# Patient Record
Sex: MALE | Race: WHITE | Employment: FULL TIME | URBAN - METROPOLITAN AREA
[De-identification: names, ages, dates, MRNs, and addresses within clinical notes are randomized per-mention and may not be internally consistent; named-entity substitution may affect disease eponyms.]

---

## 2017-03-01 ENCOUNTER — ED HISTORICAL/CONVERTED ENCOUNTER (OUTPATIENT)
Dept: OTHER | Age: 57
End: 2017-03-01

## 2017-03-02 ENCOUNTER — IP HISTORICAL/CONVERTED ENCOUNTER (OUTPATIENT)
Dept: OTHER | Age: 57
End: 2017-03-02

## 2017-03-07 ENCOUNTER — OP HISTORICAL/CONVERTED ENCOUNTER (OUTPATIENT)
Dept: OTHER | Age: 57
End: 2017-03-07

## 2017-07-11 ENCOUNTER — IP HISTORICAL/CONVERTED ENCOUNTER (OUTPATIENT)
Dept: OTHER | Age: 57
End: 2017-07-11

## 2017-07-24 ENCOUNTER — OP HISTORICAL/CONVERTED ENCOUNTER (OUTPATIENT)
Dept: OTHER | Age: 57
End: 2017-07-24

## 2017-08-23 ENCOUNTER — OP HISTORICAL/CONVERTED ENCOUNTER (OUTPATIENT)
Dept: OTHER | Age: 57
End: 2017-08-23

## 2017-09-04 ENCOUNTER — OP HISTORICAL/CONVERTED ENCOUNTER (OUTPATIENT)
Dept: OTHER | Age: 57
End: 2017-09-04

## 2020-08-24 VITALS
WEIGHT: 164 LBS | DIASTOLIC BLOOD PRESSURE: 70 MMHG | HEART RATE: 107 BPM | SYSTOLIC BLOOD PRESSURE: 130 MMHG | BODY MASS INDEX: 21.74 KG/M2 | HEIGHT: 73 IN | OXYGEN SATURATION: 98 %

## 2020-08-25 ENCOUNTER — OFFICE VISIT (OUTPATIENT)
Dept: ENT CLINIC | Age: 60
End: 2020-08-25
Payer: COMMERCIAL

## 2020-08-25 VITALS
OXYGEN SATURATION: 98 % | BODY MASS INDEX: 21.74 KG/M2 | RESPIRATION RATE: 18 BRPM | TEMPERATURE: 98.4 F | SYSTOLIC BLOOD PRESSURE: 140 MMHG | DIASTOLIC BLOOD PRESSURE: 80 MMHG | HEIGHT: 73 IN | WEIGHT: 164 LBS | HEART RATE: 82 BPM

## 2020-08-25 DIAGNOSIS — H61.23 BILATERAL IMPACTED CERUMEN: ICD-10-CM

## 2020-08-25 DIAGNOSIS — H61.303 ACQUIRED STENOSIS OF BOTH EXTERNAL EAR CANALS: ICD-10-CM

## 2020-08-25 DIAGNOSIS — H91.93 BILATERAL HEARING LOSS, UNSPECIFIED HEARING LOSS TYPE: Primary | ICD-10-CM

## 2020-08-25 PROCEDURE — 69210 REMOVE IMPACTED EAR WAX UNI: CPT | Performed by: OTOLARYNGOLOGY

## 2020-08-25 NOTE — PROGRESS NOTES
Subjective:    Es Ramp.   64 y.o.   1960     HPI     6 mos f/u ears  Not as bad as last time  But ears are feeling clogged  Hearing is affected      Review of Systems  Review of Systems   Constitutional: Negative for chills and fever. HENT: Positive for hearing loss. Respiratory: Negative for cough. Cardiovascular: Negative for chest pain. Musculoskeletal: Positive for joint pain. Neurological: Negative for dizziness and headaches. Past Medical History:   Diagnosis Date    Aneurysm ([address] 5/2015    CURRENT    Aneurysm (HCC)     Arthritis     Chest pain, unspecified     NOT CLEAR ON ETIOLOGY, POSSIBLE CHEST WALL, LESS LIKELY ANGINA, CHECK ECHO TO R/O PERIDARDITIS    Essential hypertension, benign     STABLE    GERD (gastroesophageal reflux disease)     HTN (hypertension)     Urinary frequency      Prior to Admission medications    Medication Sig Start Date End Date Taking? Authorizing Provider   levETIRAcetam (KEPPRA) 500 mg tablet Take 1 Tab by mouth every twelve (12) hours every twelve (12) hours. 7/13/15   Jimenez Ayala MD   HYDROmorphone (DILAUDID) 2 mg tablet Take 1-2 Tabs by mouth every four (4) hours as needed for Pain. Max Daily Amount: 24 mg. 7/13/15   Jimenez Ayala MD   losartan (COZAAR) 100 mg tablet Take 100 mg by mouth daily. Provider, Historical   diclofenac-misoprostol (ARTHROTEC 50)  mg-mcg per tablet Take 1 Tab by mouth two (2) times daily as needed. Provider, Historical   omeprazole (PRILOSEC) 40 mg capsule Take 40 mg by mouth daily. Provider, Historical   buPROPion SR (WELLBUTRIN SR) 150 mg SR tablet Take 150 mg by mouth daily. Indications: SMOKING CESSATION    Provider, Historical   traMADol (ULTRAM) 50 mg tablet Take 50 mg by mouth every six (6) hours as needed for Pain.     Provider, Historical            Objective:     Visit Vitals  /80 (BP 1 Location: Left arm, BP Patient Position: Sitting)   Pulse 82   Temp 98.4 °F (36.9 °C) (Oral)   Resp 18   Ht 6' 1\" (1.854 m)   Wt 164 lb (74.4 kg)   SpO2 98%   BMI 21.64 kg/m²        Physical Exam  Constitutional:       Appearance: Normal appearance. HENT:      Head: Normocephalic and atraumatic. Right Ear: Tympanic membrane and external ear normal. There is impacted cerumen. Left Ear: Tympanic membrane and external ear normal. There is impacted cerumen. Ears:      Comments: Canal stenosis    Procedure - Removal Impacted Cerumen    Ears are examined under microscope. bilateral ears are cleaned using otologic curette, suction, and/or alligator forceps. Pink solution used. Nose: Nose normal.   Eyes:      Pupils: Pupils are equal, round, and reactive to light. Neck:      Musculoskeletal: Neck supple. Pulmonary:      Effort: No respiratory distress. Skin:     General: Skin is warm. Neurological:      General: No focal deficit present. Mental Status: He is alert and oriented to person, place, and time. Psychiatric:         Mood and Affect: Mood normal.         Behavior: Behavior normal.         Assessment/Plan:     Encounter Diagnoses   Name Primary? 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 Follow-up and Dispositions    · Return in about 6 months (around 2/25/2021).

## 2021-02-23 ENCOUNTER — OFFICE VISIT (OUTPATIENT)
Dept: ENT CLINIC | Age: 61
End: 2021-02-23
Payer: COMMERCIAL

## 2021-02-23 VITALS
DIASTOLIC BLOOD PRESSURE: 90 MMHG | WEIGHT: 164 LBS | HEART RATE: 85 BPM | RESPIRATION RATE: 18 BRPM | HEIGHT: 73 IN | BODY MASS INDEX: 21.74 KG/M2 | SYSTOLIC BLOOD PRESSURE: 160 MMHG | OXYGEN SATURATION: 98 % | TEMPERATURE: 97.1 F

## 2021-02-23 DIAGNOSIS — H61.23 BILATERAL IMPACTED CERUMEN: Primary | ICD-10-CM

## 2021-02-23 PROCEDURE — 69210 REMOVE IMPACTED EAR WAX UNI: CPT | Performed by: OTOLARYNGOLOGY

## 2021-02-23 RX ORDER — ZINC GLUCONATE 50 MG
TABLET ORAL
COMMUNITY
Start: 2021-01-26 | End: 2022-07-07

## 2021-02-23 RX ORDER — GLUCOSAMINE SULFATE 1500 MG
POWDER IN PACKET (EA) ORAL
COMMUNITY
Start: 2021-01-26 | End: 2022-07-07

## 2021-02-23 RX ORDER — ALBUTEROL SULFATE 0.63 MG/3ML
SOLUTION RESPIRATORY (INHALATION)
COMMUNITY
Start: 2020-12-29

## 2021-02-23 RX ORDER — GUAIFENESIN 100 MG/5ML
LIQUID (ML) ORAL
COMMUNITY
Start: 2020-12-29

## 2021-02-23 NOTE — PROGRESS NOTES
Subjective:    Magdi Anne.   64 y.o.   1960     Followup visit     6 mos f/u ears  Not as bad as last time  But ears are feeling clogged  Hearing is affected    2/23/21 - 6 mos f/u ears has been doing well    Review of Systems  Review of Systems   Constitutional: Negative for chills and fever. HENT: Positive for hearing loss. Respiratory: Negative for cough. Cardiovascular: Negative for chest pain. Musculoskeletal: Positive for joint pain. Neurological: Negative for dizziness and headaches. Past Medical History:   Diagnosis Date    Aneurysm ([address] 5/2015    CURRENT    Aneurysm (HCC)     Arthritis     Chest pain, unspecified     NOT CLEAR ON ETIOLOGY, POSSIBLE CHEST WALL, LESS LIKELY ANGINA, CHECK ECHO TO R/O PERIDARDITIS    Essential hypertension, benign     STABLE    GERD (gastroesophageal reflux disease)     HTN (hypertension)     Urinary frequency      Prior to Admission medications    Medication Sig Start Date End Date Taking? Authorizing Provider   zinc gluconate 50 mg tablet TAKE 1 TABLET BY MOUTH EVERY DAY 1/26/21   Provider, Historical   cholecalciferol (VITAMIN D3) 25 mcg (1,000 unit) cap TAKE 1 CAPSULE BY MOUTH EVERY DAY 1/26/21   Provider, Historical   aspirin 81 mg chewable tablet TAKE 1 TABLET BY MOUTH EVERY DAY 12/29/20   Provider, Historical   albuterol (ACCUNEB) 0.63 mg/3 mL nebulizer solution INHALATION USE 1 VIAL EVERY 4 HOURS AS NEEDED FOR WHEEZING 12/29/20   Provider, Historical   levETIRAcetam (KEPPRA) 500 mg tablet Take 1 Tab by mouth every twelve (12) hours every twelve (12) hours. 7/13/15   Rafita Staton MD   HYDROmorphone (DILAUDID) 2 mg tablet Take 1-2 Tabs by mouth every four (4) hours as needed for Pain. Max Daily Amount: 24 mg. 7/13/15   Rafita Staton MD   losartan (COZAAR) 100 mg tablet Take 100 mg by mouth daily.     Provider, Historical   diclofenac-misoprostol (ARTHROTEC 50)  mg-mcg per tablet Take 1 Tab by mouth two (2) times daily as needed. Provider, Historical   omeprazole (PRILOSEC) 40 mg capsule Take 40 mg by mouth daily. Provider, Historical   buPROPion SR (WELLBUTRIN SR) 150 mg SR tablet Take 150 mg by mouth daily. Indications: SMOKING CESSATION    Provider, Historical   traMADol (ULTRAM) 50 mg tablet Take 50 mg by mouth every six (6) hours as needed for Pain. Provider, Historical            Objective:     Visit Vitals  BP (!) 160/90 (BP 1 Location: Left upper arm, BP Patient Position: Sitting, BP Cuff Size: Adult)   Pulse 85   Temp 97.1 °F (36.2 °C) (Oral)   Resp 18   Ht 6' 1\" (1.854 m)   Wt 164 lb (74.4 kg)   SpO2 98%   BMI 21.64 kg/m²      Narrow EACs  Ears cleaned AU, cerumen R>L       Procedure - Removal Impacted Cerumen    Indications: cerumen impaction    Ears are examined under microscope/headlight.  bilateral ears are cleaned using otologic curette, suction, and/or alligator forceps. Assessment/Plan:     Encounter Diagnoses   Name Primary?  Bilateral impacted cerumen Yes     Ears cleaned AU  Narrow canals and very hard wax -recommend he use drops prior to next visit  Fu 6 mos    Orders Placed This Encounter    zinc gluconate 50 mg tablet    cholecalciferol (VITAMIN D3) 25 mcg (1,000 unit) cap    aspirin 81 mg chewable tablet    albuterol (ACCUNEB) 0.63 mg/3 mL nebulizer solution     Follow-up and Dispositions    · Return in about 6 months (around 8/23/2021).

## 2021-08-26 ENCOUNTER — TRANSCRIBE ORDER (OUTPATIENT)
Dept: SCHEDULING | Age: 61
End: 2021-08-26

## 2021-08-26 DIAGNOSIS — J98.4 DISEASE OF LUNG: Primary | ICD-10-CM

## 2021-08-27 ENCOUNTER — TRANSCRIBE ORDER (OUTPATIENT)
Dept: SCHEDULING | Age: 61
End: 2021-08-27

## 2021-08-27 DIAGNOSIS — J98.4 DISEASE OF LUNG: Primary | ICD-10-CM

## 2021-09-01 ENCOUNTER — HOSPITAL ENCOUNTER (OUTPATIENT)
Dept: CT IMAGING | Age: 61
Discharge: HOME | End: 2021-09-01
Attending: INTERNAL MEDICINE
Payer: COMMERCIAL

## 2021-09-01 DIAGNOSIS — J98.4 DISEASE OF LUNG: ICD-10-CM

## 2021-09-01 PROCEDURE — 71250 CT THORAX DX C-: CPT

## 2022-03-18 PROBLEM — H61.23 BILATERAL IMPACTED CERUMEN: Status: ACTIVE | Noted: 2020-08-25

## 2022-03-19 PROBLEM — H61.303 ACQUIRED STENOSIS OF BOTH EXTERNAL EAR CANALS: Status: ACTIVE | Noted: 2020-08-25

## 2022-06-29 ENCOUNTER — TRANSCRIBE ORDER (OUTPATIENT)
Dept: SCHEDULING | Age: 62
End: 2022-06-29

## 2022-06-29 DIAGNOSIS — R10.31 RLQ ABDOMINAL PAIN: Primary | ICD-10-CM

## 2022-07-06 PROBLEM — I25.10 ASHD (ARTERIOSCLEROTIC HEART DISEASE): Status: ACTIVE | Noted: 2022-07-06

## 2022-07-06 PROBLEM — R10.11 RUQ ABDOMINAL PAIN: Status: ACTIVE | Noted: 2022-07-06

## 2022-07-06 PROBLEM — K21.9 GERD (GASTROESOPHAGEAL REFLUX DISEASE): Status: ACTIVE | Noted: 2022-07-06

## 2022-07-07 ENCOUNTER — TRANSCRIBE ORDER (OUTPATIENT)
Dept: REGISTRATION | Age: 62
End: 2022-07-07

## 2022-07-07 ENCOUNTER — OFFICE VISIT (OUTPATIENT)
Dept: GASTROENTEROLOGY | Age: 62
End: 2022-07-07
Payer: COMMERCIAL

## 2022-07-07 ENCOUNTER — HOSPITAL ENCOUNTER (OUTPATIENT)
Dept: LAB | Age: 62
Discharge: HOME | End: 2022-07-07
Attending: NURSE PRACTITIONER
Payer: COMMERCIAL

## 2022-07-07 ENCOUNTER — HOSPITAL ENCOUNTER (OUTPATIENT)
Dept: CT IMAGING | Age: 62
Discharge: HOME | End: 2022-07-07
Attending: NURSE PRACTITIONER
Payer: COMMERCIAL

## 2022-07-07 VITALS
RESPIRATION RATE: 14 BRPM | DIASTOLIC BLOOD PRESSURE: 70 MMHG | WEIGHT: 171.6 LBS | SYSTOLIC BLOOD PRESSURE: 120 MMHG | TEMPERATURE: 98.1 F | BODY MASS INDEX: 22.74 KG/M2 | HEART RATE: 69 BPM | HEIGHT: 73 IN | OXYGEN SATURATION: 98 %

## 2022-07-07 DIAGNOSIS — R10.31 ABDOMINAL PAIN, RIGHT LOWER QUADRANT: ICD-10-CM

## 2022-07-07 DIAGNOSIS — Z86.010 HISTORY OF COLON POLYPS: ICD-10-CM

## 2022-07-07 DIAGNOSIS — R10.11 RUQ ABDOMINAL PAIN: Primary | ICD-10-CM

## 2022-07-07 DIAGNOSIS — R10.31 RLQ ABDOMINAL PAIN: ICD-10-CM

## 2022-07-07 DIAGNOSIS — R10.31 ABDOMINAL PAIN, RIGHT LOWER QUADRANT: Primary | ICD-10-CM

## 2022-07-07 LAB — CREAT SERPL-MCNC: 1.13 MG/DL (ref 0.7–1.3)

## 2022-07-07 PROCEDURE — 99204 OFFICE O/P NEW MOD 45 MIN: CPT | Performed by: INTERNAL MEDICINE

## 2022-07-07 PROCEDURE — 74011000636 HC RX REV CODE- 636: Performed by: FAMILY MEDICINE

## 2022-07-07 PROCEDURE — 82565 ASSAY OF CREATININE: CPT

## 2022-07-07 PROCEDURE — 74177 CT ABD & PELVIS W/CONTRAST: CPT

## 2022-07-07 PROCEDURE — 36415 COLL VENOUS BLD VENIPUNCTURE: CPT

## 2022-07-07 RX ORDER — POLYETHYLENE GLYCOL 3350 17 G/17G
POWDER, FOR SOLUTION ORAL
Qty: 510 G | Refills: 0 | Status: SHIPPED | OUTPATIENT
Start: 2022-07-07 | End: 2022-07-13

## 2022-07-07 RX ORDER — ATORVASTATIN CALCIUM 10 MG/1
10 TABLET, FILM COATED ORAL DAILY
COMMUNITY
Start: 2022-05-28

## 2022-07-07 RX ORDER — METOPROLOL SUCCINATE 100 MG/1
100 TABLET, EXTENDED RELEASE ORAL DAILY
COMMUNITY
Start: 2022-06-17

## 2022-07-07 RX ORDER — AMLODIPINE BESYLATE 10 MG/1
10 TABLET ORAL DAILY
COMMUNITY
Start: 2022-06-17

## 2022-07-07 RX ADMIN — DIATRIZOATE MEGLUMINE AND DIATRIZOATE SODIUM 30 ML: 660; 100 LIQUID ORAL; RECTAL at 10:16

## 2022-07-07 RX ADMIN — IOPAMIDOL 100 ML: 755 INJECTION, SOLUTION INTRAVENOUS at 10:16

## 2022-07-07 NOTE — PROGRESS NOTES
1. Have you been to the ER, urgent care clinic since your last visit? Hospitalized since your last visit? no    2. Have you seen or consulted any other health care providers outside of the [address] [place] [address] since your last visit? Include any pap smears or colon screening.   No   Chief Complaint   Patient presents with    Abdominal Pain     x 1 week    New Patient     Visit Vitals  /70 (BP 1 Location: Left upper arm, BP Patient Position: Sitting, BP Cuff Size: Adult)   Pulse 69   Temp 98.1 °F (36.7 °C) (Temporal)   Resp 14   Ht 6' 1\" (1.854 m)   Wt 77.8 kg (171 lb 9.6 oz)   SpO2 98% Comment: room air   BMI 22.64 kg/m²
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
## 2022-07-08 NOTE — PROGRESS NOTES
COLONOSCOPY IS SCHEDULED FOR 7-, at 12:00 pm  NO PA REQUIRED PER LAURENT HK-ON 7-8-2022 AT 9:35 AM - REF # [id number].

## 2022-07-13 ENCOUNTER — ANESTHESIA (OUTPATIENT)
Dept: ENDOSCOPY | Age: 62
End: 2022-07-13
Payer: COMMERCIAL

## 2022-07-13 ENCOUNTER — HOSPITAL ENCOUNTER (OUTPATIENT)
Age: 62
Setting detail: OUTPATIENT SURGERY
Discharge: HOME | End: 2022-07-13
Attending: INTERNAL MEDICINE | Admitting: INTERNAL MEDICINE
Payer: COMMERCIAL

## 2022-07-13 ENCOUNTER — ANESTHESIA EVENT (OUTPATIENT)
Dept: ENDOSCOPY | Age: 62
End: 2022-07-13
Payer: COMMERCIAL

## 2022-07-13 VITALS
SYSTOLIC BLOOD PRESSURE: 141 MMHG | RESPIRATION RATE: 18 BRPM | BODY MASS INDEX: 23.55 KG/M2 | TEMPERATURE: 98.1 F | DIASTOLIC BLOOD PRESSURE: 82 MMHG | HEIGHT: 70 IN | HEART RATE: 79 BPM | OXYGEN SATURATION: 98 % | WEIGHT: 164.5 LBS

## 2022-07-13 PROCEDURE — 2709999900 HC NON-CHARGEABLE SUPPLY: Performed by: INTERNAL MEDICINE

## 2022-07-13 PROCEDURE — 45378 DIAGNOSTIC COLONOSCOPY: CPT | Performed by: INTERNAL MEDICINE

## 2022-07-13 PROCEDURE — 76040000007: Performed by: INTERNAL MEDICINE

## 2022-07-13 PROCEDURE — 74011250636 HC RX REV CODE- 250/636

## 2022-07-13 PROCEDURE — 74011250636 HC RX REV CODE- 250/636: Performed by: NURSE ANESTHETIST, CERTIFIED REGISTERED

## 2022-07-13 PROCEDURE — 74011000250 HC RX REV CODE- 250: Performed by: NURSE ANESTHETIST, CERTIFIED REGISTERED

## 2022-07-13 PROCEDURE — 74011250636 HC RX REV CODE- 250/636: Performed by: INTERNAL MEDICINE

## 2022-07-13 PROCEDURE — 76060000032 HC ANESTHESIA 0.5 TO 1 HR: Performed by: INTERNAL MEDICINE

## 2022-07-13 RX ORDER — SODIUM CHLORIDE 0.9 % (FLUSH) 0.9 %
5-40 SYRINGE (ML) INJECTION EVERY 8 HOURS
Status: DISCONTINUED | OUTPATIENT
Start: 2022-07-13 | End: 2022-07-13 | Stop reason: HOSPADM

## 2022-07-13 RX ORDER — SODIUM CHLORIDE 9 MG/ML
150 INJECTION, SOLUTION INTRAVENOUS CONTINUOUS
Status: DISCONTINUED | OUTPATIENT
Start: 2022-07-13 | End: 2022-07-13 | Stop reason: HOSPADM

## 2022-07-13 RX ORDER — PROPOFOL 10 MG/ML
INJECTION, EMULSION INTRAVENOUS AS NEEDED
Status: DISCONTINUED | OUTPATIENT
Start: 2022-07-13 | End: 2022-07-13 | Stop reason: HOSPADM

## 2022-07-13 RX ORDER — LIDOCAINE HYDROCHLORIDE 20 MG/ML
INJECTION, SOLUTION EPIDURAL; INFILTRATION; INTRACAUDAL; PERINEURAL AS NEEDED
Status: DISCONTINUED | OUTPATIENT
Start: 2022-07-13 | End: 2022-07-13 | Stop reason: HOSPADM

## 2022-07-13 RX ORDER — SODIUM CHLORIDE 0.9 % (FLUSH) 0.9 %
5-40 SYRINGE (ML) INJECTION AS NEEDED
Status: DISCONTINUED | OUTPATIENT
Start: 2022-07-13 | End: 2022-07-13 | Stop reason: HOSPADM

## 2022-07-13 RX ORDER — MIDAZOLAM HYDROCHLORIDE 1 MG/ML
INJECTION, SOLUTION INTRAMUSCULAR; INTRAVENOUS AS NEEDED
Status: DISCONTINUED | OUTPATIENT
Start: 2022-07-13 | End: 2022-07-13 | Stop reason: HOSPADM

## 2022-07-13 RX ORDER — SODIUM CHLORIDE 9 MG/ML
125 INJECTION, SOLUTION INTRAVENOUS CONTINUOUS
Status: DISCONTINUED | OUTPATIENT
Start: 2022-07-13 | End: 2022-07-13 | Stop reason: HOSPADM

## 2022-07-13 RX ADMIN — MIDAZOLAM 2 MG: 1 INJECTION INTRAMUSCULAR; INTRAVENOUS at 13:48

## 2022-07-13 RX ADMIN — PROPOFOL 60 MG: 10 INJECTION, EMULSION INTRAVENOUS at 13:49

## 2022-07-13 RX ADMIN — SODIUM CHLORIDE 150 ML/HR: 9 INJECTION, SOLUTION INTRAVENOUS at 11:43

## 2022-07-13 RX ADMIN — PROPOFOL 50 MG: 10 INJECTION, EMULSION INTRAVENOUS at 14:01

## 2022-07-13 RX ADMIN — PROPOFOL 50 MG: 10 INJECTION, EMULSION INTRAVENOUS at 13:58

## 2022-07-13 RX ADMIN — PROPOFOL 40 MG: 10 INJECTION, EMULSION INTRAVENOUS at 13:54

## 2022-07-13 RX ADMIN — LIDOCAINE HYDROCHLORIDE 40 MG: 20 INJECTION, SOLUTION EPIDURAL; INFILTRATION; INTRACAUDAL at 13:49

## 2022-07-13 RX ADMIN — PROPOFOL 30 MG: 10 INJECTION, EMULSION INTRAVENOUS at 14:04

## 2022-07-13 NOTE — OP NOTES
Colonoscopy Procedure Note      Patient: Wen Mccollum MRN: [id number]  SSN: [id number]    YOB: 1960  Age: 63 y.o. Sex: male      Date of Procedure: 7/13/2022  Date/Time:  7/13/2022 2:11 PM       IMPRESSION:     1. Normal colon to cecum         RECOMMENDATIONS:     1) Repeat colonoscopy in 5 years. INDICATION: History of colon polyps, abdominal pain     PROCEDURE PERFORMED: Colonoscopy     DESCRIPTION OF PROCEDURE: An informed consent was obtained. The patient was placed in left lateral position. Perianal inspection and a digital rectal exam was performed. Video colonoscope was introduced into the rectum and advanced under direct vision up to the terminal ileum. With adequate insufflation and maneuvering of the withdrawing scope, the colonic mucosa was visualized carefully. Retroflexion was performed in the rectum to see the anorectum and also in the ascending colon to look behind the folds. Vital signs, pulse oximetry, single lead cardiac monitor were monitored throughout the procedure as the sedation was titrated to the desired effect ensuring patient comfort and safety. The patient tolerated the procedure very well and was transferred to the recovery area. Following is the summary of findings: No mucosal lesions were noted to the level of the cecum. ENDOSCOPIST: Leigh Ann Lane MD      ENDOSCOPE: Olympus videocolonoscope     ASSISTANT:Circ-1: Umer Farah              Scrub Tech-1: Charli Queen     ANESTHESIA: TIVA      QUALITY OF PREPARATION: Good      FINDINGS:   1.  Normal colon to cecum       Complications: None     EBL: None     SPECIMENS: * No specimens in log Kiera Pena MD  July 13, 2022  2:11 PM

## 2022-07-13 NOTE — ANESTHESIA POSTPROCEDURE EVALUATION
Procedure(s):  COLONOSCOPY (TIVA). MAC, total IV anesthesia    Anesthesia Post Evaluation      Multimodal analgesia: multimodal analgesia used between 6 hours prior to anesthesia start to PACU discharge  Patient location during evaluation: PACU  Patient participation: complete - patient participated  Level of consciousness: awake  Pain score: 0  Pain management: adequate  Airway patency: patent  Anesthetic complications: no  Cardiovascular status: acceptable  Respiratory status: room air  Hydration status: acceptable  Post anesthesia nausea and vomiting:  none  Final Post Anesthesia Temperature Assessment:  Normothermia (36.0-37.5 degrees C)      INITIAL Post-op Vital signs: No vitals data found for the desired time range.

## 2022-07-13 NOTE — DISCHARGE INSTRUCTIONS

## 2022-07-13 NOTE — ANESTHESIA PREPROCEDURE EVALUATION
Relevant Problems   CARDIOVASCULAR   (+) ASHD (arteriosclerotic heart disease)   (+) Essential hypertension, benign   (+) HTN (hypertension)      GASTROINTESTINAL   (+) GERD (gastroesophageal reflux disease)       Anesthetic History               Review of Systems / Medical History      Pulmonary          Smoker         Neuro/Psych              Cardiovascular                       GI/Hepatic/Renal                Endo/Other             Other Findings              Physical Exam    Airway  Mallampati: III  TM Distance: 4 - 6 cm  Neck ROM: normal range of motion   Mouth opening: Normal     Cardiovascular               Dental  No notable dental hx       Pulmonary                 Abdominal         Other Findings            Anesthetic Plan    ASA: 3  Anesthesia type: MAC and total IV anesthesia            Anesthetic plan and risks discussed with: Patient

## 2022-08-08 ENCOUNTER — OFFICE VISIT (OUTPATIENT)
Dept: SURGERY | Age: 62
End: 2022-08-08
Payer: COMMERCIAL

## 2022-08-08 VITALS
RESPIRATION RATE: 18 BRPM | HEART RATE: 77 BPM | BODY MASS INDEX: 23.38 KG/M2 | OXYGEN SATURATION: 98 % | WEIGHT: 176.4 LBS | SYSTOLIC BLOOD PRESSURE: 150 MMHG | HEIGHT: 73 IN | TEMPERATURE: 97.8 F | DIASTOLIC BLOOD PRESSURE: 86 MMHG

## 2022-08-08 DIAGNOSIS — R10.31 RLQ ABDOMINAL PAIN: ICD-10-CM

## 2022-08-08 PROCEDURE — 99203 OFFICE O/P NEW LOW 30 MIN: CPT | Performed by: COLON & RECTAL SURGERY

## 2022-08-08 PROCEDURE — 3074F SYST BP LT 130 MM HG: CPT | Performed by: COLON & RECTAL SURGERY

## 2022-08-08 PROCEDURE — 3078F DIAST BP <80 MM HG: CPT | Performed by: COLON & RECTAL SURGERY

## 2022-08-08 RX ORDER — LANOLIN ALCOHOL/MO/W.PET/CERES
400 CREAM (GRAM) TOPICAL DAILY
COMMUNITY
Start: 2022-06-27

## 2022-10-03 ENCOUNTER — HOSPITAL ENCOUNTER (EMERGENCY)
Age: 62
Discharge: HOME | End: 2022-10-03
Attending: STUDENT IN AN ORGANIZED HEALTH CARE EDUCATION/TRAINING PROGRAM
Payer: COMMERCIAL

## 2022-10-03 ENCOUNTER — APPOINTMENT (OUTPATIENT)
Dept: CT IMAGING | Age: 62
End: 2022-10-03
Attending: STUDENT IN AN ORGANIZED HEALTH CARE EDUCATION/TRAINING PROGRAM
Payer: COMMERCIAL

## 2022-10-03 VITALS
BODY MASS INDEX: 23.33 KG/M2 | HEIGHT: 73 IN | TEMPERATURE: 97.9 F | WEIGHT: 176 LBS | HEART RATE: 77 BPM | OXYGEN SATURATION: 97 % | SYSTOLIC BLOOD PRESSURE: 148 MMHG | RESPIRATION RATE: 20 BRPM | DIASTOLIC BLOOD PRESSURE: 82 MMHG

## 2022-10-03 DIAGNOSIS — K29.00 ACUTE SUPERFICIAL GASTRITIS WITHOUT HEMORRHAGE: Primary | ICD-10-CM

## 2022-10-03 LAB
ALBUMIN SERPL-MCNC: 4.3 G/DL (ref 3.5–5)
ALBUMIN/GLOB SERPL: 1.3 {RATIO} (ref 1.1–2.2)
ALP SERPL-CCNC: 81 U/L (ref 45–117)
ALT SERPL-CCNC: 121 U/L (ref 12–78)
ANION GAP SERPL CALC-SCNC: 11 MMOL/L (ref 5–15)
APPEARANCE UR: ABNORMAL
AST SERPL W P-5'-P-CCNC: 139 U/L (ref 15–37)
BACTERIA URNS QL MICRO: NEGATIVE /HPF
BASOPHILS # BLD: 0 K/UL (ref 0–0.1)
BASOPHILS NFR BLD: 0 % (ref 0–1)
BILIRUB SERPL-MCNC: 0.5 MG/DL (ref 0.2–1)
BILIRUB UR QL: NEGATIVE
BUN SERPL-MCNC: 14 MG/DL (ref 6–20)
BUN/CREAT SERPL: 11 (ref 12–20)
CA-I BLD-MCNC: 9.4 MG/DL (ref 8.5–10.1)
CHLORIDE SERPL-SCNC: 99 MMOL/L (ref 97–108)
CO2 SERPL-SCNC: 24 MMOL/L (ref 21–32)
COLLECT DATE STL: NORMAL
COLOR UR: ABNORMAL
CREAT SERPL-MCNC: 1.33 MG/DL (ref 0.7–1.3)
DIFFERENTIAL METHOD BLD: NORMAL
EOSINOPHIL # BLD: 0 K/UL (ref 0–0.4)
EOSINOPHIL NFR BLD: 1 % (ref 0–7)
ERYTHROCYTE [DISTWIDTH] IN BLOOD BY AUTOMATED COUNT: 13.5 % (ref 11.5–14.5)
GLOBULIN SER CALC-MCNC: 3.2 G/DL (ref 2–4)
GLUCOSE SERPL-MCNC: 104 MG/DL (ref 65–100)
GLUCOSE UR STRIP.AUTO-MCNC: NEGATIVE MG/DL
HCT VFR BLD AUTO: 41.8 % (ref 36.6–50.3)
HEMOCCULT SP1 STL QL: NEGATIVE
HGB BLD-MCNC: 15 G/DL (ref 12.1–17)
HGB UR QL STRIP: NEGATIVE
HYALINE CASTS URNS QL MICRO: ABNORMAL /LPF (ref 0–5)
IMM GRANULOCYTES # BLD AUTO: 0 K/UL (ref 0–0.04)
IMM GRANULOCYTES NFR BLD AUTO: 0 % (ref 0–0.5)
KETONES UR QL STRIP.AUTO: 5 MG/DL
LEUKOCYTE ESTERASE UR QL STRIP.AUTO: ABNORMAL
LYMPHOCYTES # BLD: 1 K/UL (ref 0.8–3.5)
LYMPHOCYTES NFR BLD: 15 % (ref 12–49)
MCH RBC QN AUTO: 33.7 PG (ref 26–34)
MCHC RBC AUTO-ENTMCNC: 35.9 G/DL (ref 30–36.5)
MCV RBC AUTO: 93.9 FL (ref 80–99)
MONOCYTES # BLD: 0.7 K/UL (ref 0–1)
MONOCYTES NFR BLD: 10 % (ref 5–13)
MUCOUS THREADS URNS QL MICRO: ABNORMAL /LPF
NEUTS SEG # BLD: 4.9 K/UL (ref 1.8–8)
NEUTS SEG NFR BLD: 74 % (ref 32–75)
NITRITE UR QL STRIP.AUTO: NEGATIVE
NRBC # BLD: 0 K/UL (ref 0–0.01)
NRBC BLD-RTO: 0 PER 100 WBC
PH UR STRIP: 5 [PH] (ref 5–8)
PLATELET # BLD AUTO: 180 K/UL (ref 150–400)
PMV BLD AUTO: 10 FL (ref 8.9–12.9)
POTASSIUM SERPL-SCNC: 3.5 MMOL/L (ref 3.5–5.1)
PROT SERPL-MCNC: 7.5 G/DL (ref 6.4–8.2)
PROT UR STRIP-MCNC: 30 MG/DL
RBC # BLD AUTO: 4.45 M/UL (ref 4.1–5.7)
RBC #/AREA URNS HPF: ABNORMAL /HPF (ref 0–5)
SODIUM SERPL-SCNC: 134 MMOL/L (ref 136–145)
SP GR UR REFRACTOMETRY: 1.02 (ref 1–1.03)
UROBILINOGEN UR QL STRIP.AUTO: 0.1 EU/DL (ref 0.1–1)
WBC # BLD AUTO: 6.6 K/UL (ref 4.1–11.1)
WBC URNS QL MICRO: ABNORMAL /HPF (ref 0–4)

## 2022-10-03 PROCEDURE — 80053 COMPREHEN METABOLIC PANEL: CPT

## 2022-10-03 PROCEDURE — 82272 OCCULT BLD FECES 1-3 TESTS: CPT

## 2022-10-03 PROCEDURE — 85025 COMPLETE CBC W/AUTO DIFF WBC: CPT

## 2022-10-03 PROCEDURE — 36415 COLL VENOUS BLD VENIPUNCTURE: CPT

## 2022-10-03 PROCEDURE — 96375 TX/PRO/DX INJ NEW DRUG ADDON: CPT

## 2022-10-03 PROCEDURE — 96374 THER/PROPH/DIAG INJ IV PUSH: CPT

## 2022-10-03 PROCEDURE — 74011000636 HC RX REV CODE- 636: Performed by: STUDENT IN AN ORGANIZED HEALTH CARE EDUCATION/TRAINING PROGRAM

## 2022-10-03 PROCEDURE — 74011250636 HC RX REV CODE- 250/636: Performed by: STUDENT IN AN ORGANIZED HEALTH CARE EDUCATION/TRAINING PROGRAM

## 2022-10-03 PROCEDURE — 81001 URINALYSIS AUTO W/SCOPE: CPT

## 2022-10-03 PROCEDURE — 99285 EMERGENCY DEPT VISIT HI MDM: CPT

## 2022-10-03 PROCEDURE — 74011000250 HC RX REV CODE- 250: Performed by: STUDENT IN AN ORGANIZED HEALTH CARE EDUCATION/TRAINING PROGRAM

## 2022-10-03 PROCEDURE — 74177 CT ABD & PELVIS W/CONTRAST: CPT

## 2022-10-03 RX ORDER — ONDANSETRON 4 MG/1
4 TABLET, ORALLY DISINTEGRATING ORAL
Qty: 10 TABLET | Refills: 0 | Status: SHIPPED | OUTPATIENT
Start: 2022-10-03

## 2022-10-03 RX ORDER — SUCRALFATE 1 G/1
1 TABLET ORAL 4 TIMES DAILY
Qty: 30 TABLET | Refills: 0 | Status: SHIPPED | OUTPATIENT
Start: 2022-10-03

## 2022-10-03 RX ORDER — LORAZEPAM 2 MG/ML
1 INJECTION INTRAMUSCULAR
Status: COMPLETED | OUTPATIENT
Start: 2022-10-03 | End: 2022-10-03

## 2022-10-03 RX ORDER — FAMOTIDINE 20 MG/1
20 TABLET, FILM COATED ORAL 2 TIMES DAILY
Qty: 20 TABLET | Refills: 0 | Status: SHIPPED | OUTPATIENT
Start: 2022-10-03 | End: 2022-10-13

## 2022-10-03 RX ORDER — ONDANSETRON 2 MG/ML
4 INJECTION INTRAMUSCULAR; INTRAVENOUS ONCE
Status: COMPLETED | OUTPATIENT
Start: 2022-10-03 | End: 2022-10-03

## 2022-10-03 RX ADMIN — ONDANSETRON 4 MG: 2 INJECTION INTRAMUSCULAR; INTRAVENOUS at 11:31

## 2022-10-03 RX ADMIN — IOPAMIDOL 100 ML: 755 INJECTION, SOLUTION INTRAVENOUS at 12:41

## 2022-10-03 RX ADMIN — FAMOTIDINE 20 MG: 10 INJECTION, SOLUTION INTRAVENOUS at 11:31

## 2022-10-03 RX ADMIN — LORAZEPAM 1 MG: 2 INJECTION INTRAMUSCULAR; INTRAVENOUS at 11:31

## 2022-10-03 NOTE — ED TRIAGE NOTES
Patient complains of abdominal pain for the past few weeks, notice black stools. Has nausea and vomiting as well.
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
## 2023-04-25 PROBLEM — R10.31 RLQ ABDOMINAL PAIN: Status: ACTIVE | Noted: 2023-04-25
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
## 2023-05-17 ENCOUNTER — APPOINTMENT (OUTPATIENT)
Facility: HOSPITAL | Age: 63
End: 2023-05-17
Payer: COMMERCIAL

## 2023-05-17 ENCOUNTER — HOSPITAL ENCOUNTER (OUTPATIENT)
Facility: HOSPITAL | Age: 63
Setting detail: OBSERVATION
LOS: 1 days | Discharge: OTHER INSTITUTION | End: 2023-05-19
Attending: EMERGENCY MEDICINE | Admitting: FAMILY MEDICINE
Payer: COMMERCIAL

## 2023-05-17 DIAGNOSIS — R55 SYNCOPE AND COLLAPSE: Primary | ICD-10-CM

## 2023-05-17 PROBLEM — R42 SYNCOPAL VERTIGO: Status: ACTIVE | Noted: 2023-05-17

## 2023-05-17 LAB
ALBUMIN SERPL-MCNC: 3.3 G/DL (ref 3.5–5)
ALBUMIN/GLOB SERPL: 0.9 (ref 1.1–2.2)
ALP SERPL-CCNC: 57 U/L (ref 45–117)
ALT SERPL-CCNC: 37 U/L (ref 12–78)
ANION GAP SERPL CALC-SCNC: 7 MMOL/L (ref 5–15)
AST SERPL W P-5'-P-CCNC: 42 U/L (ref 15–37)
BASOPHILS # BLD: 0 K/UL (ref 0–0.1)
BASOPHILS NFR BLD: 0 % (ref 0–1)
BILIRUB SERPL-MCNC: 0.8 MG/DL (ref 0.2–1)
BUN SERPL-MCNC: 24 MG/DL (ref 6–20)
BUN/CREAT SERPL: 22 (ref 12–20)
CA-I BLD-MCNC: 8.6 MG/DL (ref 8.5–10.1)
CHLORIDE SERPL-SCNC: 101 MMOL/L (ref 97–108)
CO2 SERPL-SCNC: 24 MMOL/L (ref 21–32)
CREAT SERPL-MCNC: 1.07 MG/DL (ref 0.7–1.3)
DIFFERENTIAL METHOD BLD: ABNORMAL
EKG ATRIAL RATE: 70 BPM
EKG DIAGNOSIS: NORMAL
EKG P AXIS: 78 DEGREES
EKG P-R INTERVAL: 140 MS
EKG Q-T INTERVAL: 396 MS
EKG QRS DURATION: 96 MS
EKG QTC CALCULATION (BAZETT): 427 MS
EKG R AXIS: 62 DEGREES
EKG T AXIS: 72 DEGREES
EKG VENTRICULAR RATE: 70 BPM
EOSINOPHIL # BLD: 0.1 K/UL (ref 0–0.4)
EOSINOPHIL NFR BLD: 1 % (ref 0–7)
ERYTHROCYTE [DISTWIDTH] IN BLOOD BY AUTOMATED COUNT: 13.3 % (ref 11.5–14.5)
ETHANOL SERPL-MCNC: 34 MG/DL (ref 0–0.08)
GLOBULIN SER CALC-MCNC: 3.6 G/DL (ref 2–4)
GLUCOSE SERPL-MCNC: 80 MG/DL (ref 65–100)
HCT VFR BLD AUTO: 29.4 % (ref 36.6–50.3)
HGB BLD-MCNC: 10.9 G/DL (ref 12.1–17)
IMM GRANULOCYTES # BLD AUTO: 0 K/UL (ref 0–0.04)
IMM GRANULOCYTES NFR BLD AUTO: 0 % (ref 0–0.5)
LYMPHOCYTES # BLD: 0.6 K/UL (ref 0.8–3.5)
LYMPHOCYTES NFR BLD: 7 % (ref 12–49)
MCH RBC QN AUTO: 34.8 PG (ref 26–34)
MCHC RBC AUTO-ENTMCNC: 37.1 G/DL (ref 30–36.5)
MCV RBC AUTO: 93.9 FL (ref 80–99)
MONOCYTES # BLD: 1.1 K/UL (ref 0–1)
MONOCYTES NFR BLD: 13 % (ref 5–13)
NEUTS SEG # BLD: 6.6 K/UL (ref 1.8–8)
NEUTS SEG NFR BLD: 79 % (ref 32–75)
NRBC # BLD: 0 K/UL (ref 0–0.01)
NRBC BLD-RTO: 0 PER 100 WBC
PLATELET # BLD AUTO: 141 K/UL (ref 150–400)
PMV BLD AUTO: 10.6 FL (ref 8.9–12.9)
POTASSIUM SERPL-SCNC: 3.5 MMOL/L (ref 3.5–5.1)
PROT SERPL-MCNC: 6.9 G/DL (ref 6.4–8.2)
RBC # BLD AUTO: 3.13 M/UL (ref 4.1–5.7)
SODIUM SERPL-SCNC: 132 MMOL/L (ref 136–145)
TROPONIN I SERPL HS-MCNC: 6 NG/L (ref 0–76)
TROPONIN I SERPL HS-MCNC: 7 NG/L (ref 0–76)
WBC # BLD AUTO: 8.4 K/UL (ref 4.1–11.1)

## 2023-05-17 PROCEDURE — 6370000000 HC RX 637 (ALT 250 FOR IP): Performed by: EMERGENCY MEDICINE

## 2023-05-17 PROCEDURE — 36415 COLL VENOUS BLD VENIPUNCTURE: CPT

## 2023-05-17 PROCEDURE — G0378 HOSPITAL OBSERVATION PER HR: HCPCS

## 2023-05-17 PROCEDURE — 73110 X-RAY EXAM OF WRIST: CPT

## 2023-05-17 PROCEDURE — 85025 COMPLETE CBC W/AUTO DIFF WBC: CPT

## 2023-05-17 PROCEDURE — 93005 ELECTROCARDIOGRAM TRACING: CPT | Performed by: EMERGENCY MEDICINE

## 2023-05-17 PROCEDURE — 96372 THER/PROPH/DIAG INJ SC/IM: CPT

## 2023-05-17 PROCEDURE — 6360000002 HC RX W HCPCS: Performed by: FAMILY MEDICINE

## 2023-05-17 PROCEDURE — 80053 COMPREHEN METABOLIC PANEL: CPT

## 2023-05-17 PROCEDURE — 6370000000 HC RX 637 (ALT 250 FOR IP): Performed by: FAMILY MEDICINE

## 2023-05-17 PROCEDURE — 99285 EMERGENCY DEPT VISIT HI MDM: CPT

## 2023-05-17 PROCEDURE — 82077 ASSAY SPEC XCP UR&BREATH IA: CPT

## 2023-05-17 PROCEDURE — 84484 ASSAY OF TROPONIN QUANT: CPT

## 2023-05-17 PROCEDURE — 71045 X-RAY EXAM CHEST 1 VIEW: CPT

## 2023-05-17 PROCEDURE — 1100000000 HC RM PRIVATE

## 2023-05-17 PROCEDURE — 70450 CT HEAD/BRAIN W/O DYE: CPT

## 2023-05-17 RX ORDER — ACETAMINOPHEN 325 MG/1
650 TABLET ORAL EVERY 6 HOURS PRN
Status: DISCONTINUED | OUTPATIENT
Start: 2023-05-17 | End: 2023-05-19 | Stop reason: HOSPADM

## 2023-05-17 RX ORDER — ASPIRIN 81 MG/1
81 TABLET, CHEWABLE ORAL DAILY
Status: DISCONTINUED | OUTPATIENT
Start: 2023-05-17 | End: 2023-05-19 | Stop reason: HOSPADM

## 2023-05-17 RX ORDER — AMLODIPINE BESYLATE 5 MG/1
10 TABLET ORAL DAILY
Status: DISCONTINUED | OUTPATIENT
Start: 2023-05-17 | End: 2023-05-19 | Stop reason: HOSPADM

## 2023-05-17 RX ORDER — LOSARTAN POTASSIUM 100 MG/1
100 TABLET ORAL DAILY
Qty: 30 TABLET | Refills: 11 | COMMUNITY
Start: 2023-04-20 | End: 2024-04-19

## 2023-05-17 RX ORDER — CARVEDILOL 12.5 MG/1
12.5 TABLET ORAL 2 TIMES DAILY
COMMUNITY
Start: 2023-04-20

## 2023-05-17 RX ORDER — SODIUM CHLORIDE 0.9 % (FLUSH) 0.9 %
5-40 SYRINGE (ML) INJECTION PRN
Status: DISCONTINUED | OUTPATIENT
Start: 2023-05-17 | End: 2023-05-19 | Stop reason: HOSPADM

## 2023-05-17 RX ORDER — SODIUM CHLORIDE 9 MG/ML
INJECTION, SOLUTION INTRAVENOUS PRN
Status: DISCONTINUED | OUTPATIENT
Start: 2023-05-17 | End: 2023-05-19 | Stop reason: HOSPADM

## 2023-05-17 RX ORDER — SODIUM CHLORIDE 0.9 % (FLUSH) 0.9 %
5-40 SYRINGE (ML) INJECTION EVERY 12 HOURS SCHEDULED
Status: DISCONTINUED | OUTPATIENT
Start: 2023-05-17 | End: 2023-05-17

## 2023-05-17 RX ORDER — OMEPRAZOLE 10 MG/1
10 CAPSULE, DELAYED RELEASE ORAL DAILY
COMMUNITY

## 2023-05-17 RX ORDER — ONDANSETRON 4 MG/1
4 TABLET, ORALLY DISINTEGRATING ORAL EVERY 8 HOURS PRN
Status: DISCONTINUED | OUTPATIENT
Start: 2023-05-17 | End: 2023-05-19 | Stop reason: HOSPADM

## 2023-05-17 RX ORDER — ACETAMINOPHEN 650 MG/1
650 SUPPOSITORY RECTAL EVERY 6 HOURS PRN
Status: DISCONTINUED | OUTPATIENT
Start: 2023-05-17 | End: 2023-05-19 | Stop reason: HOSPADM

## 2023-05-17 RX ORDER — ONDANSETRON 2 MG/ML
4 INJECTION INTRAMUSCULAR; INTRAVENOUS EVERY 6 HOURS PRN
Status: DISCONTINUED | OUTPATIENT
Start: 2023-05-17 | End: 2023-05-19 | Stop reason: HOSPADM

## 2023-05-17 RX ORDER — AMLODIPINE BESYLATE 10 MG/1
10 TABLET ORAL DAILY
Qty: 30 TABLET | Refills: 22 | COMMUNITY
Start: 2022-06-17 | End: 2024-04-19

## 2023-05-17 RX ORDER — ATORVASTATIN CALCIUM 40 MG/1
40 TABLET, FILM COATED ORAL NIGHTLY
Status: DISCONTINUED | OUTPATIENT
Start: 2023-05-17 | End: 2023-05-19 | Stop reason: HOSPADM

## 2023-05-17 RX ORDER — CARVEDILOL 12.5 MG/1
12.5 TABLET ORAL 2 TIMES DAILY
Status: DISCONTINUED | OUTPATIENT
Start: 2023-05-17 | End: 2023-05-19 | Stop reason: HOSPADM

## 2023-05-17 RX ORDER — POLYETHYLENE GLYCOL 3350 17 G/17G
17 POWDER, FOR SOLUTION ORAL DAILY PRN
Status: DISCONTINUED | OUTPATIENT
Start: 2023-05-17 | End: 2023-05-19 | Stop reason: HOSPADM

## 2023-05-17 RX ORDER — SODIUM CHLORIDE 9 MG/ML
INJECTION, SOLUTION INTRAVENOUS CONTINUOUS
Status: DISCONTINUED | OUTPATIENT
Start: 2023-05-17 | End: 2023-05-19 | Stop reason: HOSPADM

## 2023-05-17 RX ORDER — CHOLECALCIFEROL (VITAMIN D3) 125 MCG
5 CAPSULE ORAL NIGHTLY PRN
Status: DISCONTINUED | OUTPATIENT
Start: 2023-05-17 | End: 2023-05-19 | Stop reason: HOSPADM

## 2023-05-17 RX ORDER — PANTOPRAZOLE SODIUM 40 MG/1
40 TABLET, DELAYED RELEASE ORAL
Status: DISCONTINUED | OUTPATIENT
Start: 2023-05-18 | End: 2023-05-19 | Stop reason: HOSPADM

## 2023-05-17 RX ORDER — NICOTINE 21 MG/24HR
1 PATCH, TRANSDERMAL 24 HOURS TRANSDERMAL DAILY
Status: DISCONTINUED | OUTPATIENT
Start: 2023-05-17 | End: 2023-05-19 | Stop reason: HOSPADM

## 2023-05-17 RX ORDER — ENOXAPARIN SODIUM 100 MG/ML
40 INJECTION SUBCUTANEOUS DAILY
Status: DISCONTINUED | OUTPATIENT
Start: 2023-05-17 | End: 2023-05-19 | Stop reason: HOSPADM

## 2023-05-17 RX ORDER — CYCLOBENZAPRINE HCL 10 MG
10 TABLET ORAL 3 TIMES DAILY PRN
Status: ON HOLD | COMMUNITY
Start: 2023-05-08 | End: 2023-05-19 | Stop reason: HOSPADM

## 2023-05-17 RX ORDER — LOSARTAN POTASSIUM 50 MG/1
100 TABLET ORAL DAILY
Status: DISCONTINUED | OUTPATIENT
Start: 2023-05-17 | End: 2023-05-19 | Stop reason: HOSPADM

## 2023-05-17 RX ADMIN — ACETAMINOPHEN 650 MG: 325 TABLET ORAL at 18:47

## 2023-05-17 RX ADMIN — ENOXAPARIN SODIUM 40 MG: 100 INJECTION SUBCUTANEOUS at 18:47

## 2023-05-17 ASSESSMENT — PAIN SCALES - GENERAL
PAINLEVEL_OUTOF10: 7
PAINLEVEL_OUTOF10: 3
PAINLEVEL_OUTOF10: 7
PAINLEVEL_OUTOF10: 6

## 2023-05-17 ASSESSMENT — LIFESTYLE VARIABLES
HOW OFTEN DO YOU HAVE A DRINK CONTAINING ALCOHOL: 4 OR MORE TIMES A WEEK
HOW MANY STANDARD DRINKS CONTAINING ALCOHOL DO YOU HAVE ON A TYPICAL DAY: 7 TO 9

## 2023-05-17 ASSESSMENT — PAIN DESCRIPTION - LOCATION
LOCATION: WRIST;SHOULDER
LOCATION: WRIST;SHOULDER
LOCATION: ARM
LOCATION: ARM

## 2023-05-17 ASSESSMENT — PAIN DESCRIPTION - ORIENTATION
ORIENTATION: RIGHT

## 2023-05-17 ASSESSMENT — PAIN - FUNCTIONAL ASSESSMENT: PAIN_FUNCTIONAL_ASSESSMENT: 0-10

## 2023-05-17 NOTE — CARE COORDINATION
05/17/23 1446   Service Assessment   Patient Orientation Alert and Oriented   Cognition Alert   History Provided By Other (see comment)  (Sister  Juan M Madrigal. ( [facility]))   Primary Caregiver Self   Support Systems Family Members   Patient's Healthcare Decision Maker is: Legal Next of Galo Floyd   PCP Verified by CM Yes  No [place])   Last Visit to PCP Within last 3 months  (Seen today 5/17/23.)   Prior Functional Level Independent in ADLs/IADLs   Current Functional Level Independent in ADLs/IADLs   Can patient return to prior living arrangement Yes   Ability to make needs known: Good   Family able to assist with home care needs: Yes  (If needed.)   Would you like for me to discuss the discharge plan with any other family members/significant others, and if so, who? Yes  (Sister Juan M Madrigal.)   [employer] None   Social/Functional History   Lives With Alone   Type of [address] [place] [address] One level   Home Access Stairs to enter without rails   Bathroom Shower/Tub Tub/Shower unit   Bathroom Toilet Standard   Bathroom Equipment None   Bathroom Accessibility Accessible   Receives Help From [address] Responsibilities Yes   Ambulation Assistance Independent   Transfer Assistance Independent   Active  Yes   Mode of Transportation Car   Occupation Full time employment  (Works as [employer] for [employer].)   Type of Occupation    Discharge Planning   Type of Residence [employer] [address] [facility] Prior To Admission None   Potential Assistance Needed N/A   DME Ordered? No   Potential Assistance Purchasing Medications No   Type of Home Care Services None   Patient expects to be discharged to: Trailer/mobile home   History of falls? 0   Services At/After Discharge   Transition of Care Consult (CM Consult) Discharge [address] [place] [address] [facility] Provided?  No   Mode of Transport at Discharge Other

## 2023-05-17 NOTE — ED NOTES
Multiple abrasions noted on body to L temple, and R clavicle larger abrasions     Mary Thomason RN  05/17/23 9159

## 2023-05-17 NOTE — ED PROVIDER NOTES
[facility] EMERGENCY DEPT  EMERGENCY DEPARTMENT HISTORY AND PHYSICAL EXAM      Date: 5/17/2023  Patient Name: Jorge Luis Dodson. MRN: [id number]  YOB: 1960  Date of evaluation: 5/17/2023  Provider: Adenike Ruby MD   Note Started: 1:05 PM EDT 5/17/23    HISTORY OF PRESENT ILLNESS     Chief Complaint   Patient presents with    Loss of Consciousness    Fatigue       History Provided By: Patient    HPI: Jorge Luis Means is a 63 y.o. male with history of hypertension presenting with syncope and frequent falls. Patient states over the past few days he has been passing out multiple times and falling. Denies any chest pain, shortness of breath. He has abrasions and bruising to his head, right shoulder, and right wrist due to falls. Patient states he was recently changed from metoprolol to carvedilol 4 weeks ago. Patient smokes 1.5 PPD. Denies drug use. States he drinks 6-8 beers on the weekends.     PAST MEDICAL HISTORY   Past Medical History:  Past Medical History:   Diagnosis Date    Aneurysm ([address]     Aneurysm ([address] 5/2015    CURRENT    Arthritis     ASHD (arteriosclerotic heart disease) 7/6/2022    Chest pain, unspecified     NOT CLEAR ON ETIOLOGY, POSSIBLE CHEST WALL, LESS LIKELY ANGINA, CHECK ECHO TO R/O PERIDARDITIS    Colon polyps     COVID-19 01/2022    Essential hypertension, benign     STABLE    GERD (gastroesophageal reflux disease)     HTN (hypertension)     RUQ abdominal pain 7/6/2022    Urinary frequency        Past Surgical History:  Past Surgical History:   Procedure Laterality Date    APPENDECTOMY,W OTHR C      BRAIN ANEURYSM SURGERY      COLONOSCOPY N/A 7/13/2022    COLONOSCOPY (TIVA) performed by Aidee Mccord MD at [address]      over 15 years ago-HX [address]  2005    LEFT ELBOW    OTHER SURGICAL HISTORY      STENTS    OTHER SURGICAL HISTORY      elbow    OTHER SURGICAL HISTORY  1990    maxilofacial surgery    OTHER

## 2023-05-17 NOTE — ED TRIAGE NOTES
Patient arrives by EMS from doctor's office c/o synope. Did not fully pass out today per EMS, No LOC. He did have two syncopal episodes and fell on Monday. On Monday night, he fell and hit his head on the kitchen table. Reports that he has fallen down from passing out 5 times in one day. Takes pain medications and blood pressure medications. Blood glucose 83 and normal sinus on 12 lead with EMS.   600mL Normal saline given by ems prior to arrival.

## 2023-05-17 NOTE — H&P
History and Physical    NAME:   Shyam Guadarrama. :  1960   MRN:  [id number]     Date/Time: 2023 6:26 PM    Patient PCP: Britt Cm MD  ______________________________________________________________________       Subjective:     CHIEF COMPLAINT:     Syncope    HISTORY OF PRESENT ILLNESS:       Patient is a 63y.o. year old male with a hx of brain aneurysm with clipping, DDD, arteriosclerotic heart disease, HTN, GERD, HLD, colon polyps, previous appendectomy arrives via EMS with a cc of syncope. The patient was at his PCP office getting his BP taken when he lost consciousness for a couple of seconds. The patient states that he has had 5 similar syncopal episodes this week. Another episode occurred after the patient had been sitting and eating dinner for a long period of time. He has hit his head and extremities during the falls. He endorses lightheadedness and palpitations just prior to the syncopal episodes. He denies chest pain, SOB, fever, chills, abdominal pain, headache, changes in vision, weakness, hematochezia, N/V, change in urine. Head CT without contrast: Inferior right frontal lobe encephalomalacia. No acute intracranial hemorrhage, mass or infarct. CXR shows no acute process  3v XR of right wrist shows no fracture    EKG: Normal sinus rhythm   Moderate voltage criteria for LVH, may be normal variant   Nonspecific ST and T wave abnormality   Abnormal ECG   When compared with ECG of 08-AUG-2020 19:28,   PREVIOUS ECG IS PRESENT     Blood EtOH level is 34. States he drinks 6-8 beers on the weekends. He again declines any recent drinking but his wife when interviewed privately does report that patient \"likes to drink\" and probably drinks every day. Received 600mL NS from EMS.      Past Medical History:   Diagnosis Date    Aneurysm ([address]     Aneurysm ([address] 2015    CURRENT    Arthritis     ASHD (arteriosclerotic heart disease) 2022    Chest pain, unspecified     NOT CLEAR ON
LVH, may be normal variant  Nonspecific ST and T wave abnormality  Abnormal ECG  When compared with ECG of 08-AUG-2020 19:28,  PREVIOUS ECG IS PRESENT  Confirmed by Lyle Farmer ([id number]) on 5/17/2023 3:27:07 PM     CBC with Auto Differential    Collection Time: 05/17/23 12:33 PM   Result Value Ref Range    WBC 8.4 4.1 - 11.1 K/uL    RBC 3.13 (L) 4.10 - 5.70 M/uL    Hemoglobin 10.9 (L) 12.1 - 17.0 g/dL    Hematocrit 29.4 (L) 36.6 - 50.3 %    MCV 93.9 80.0 - 99.0 FL    MCH 34.8 (H) 26.0 - 34.0 PG    MCHC 37.1 (H) 30.0 - 36.5 g/dL    RDW 13.3 11.5 - 14.5 %    Platelets 528 (L) 474 - 400 K/uL    MPV 10.6 8.9 - 12.9 FL    Nucleated RBCs 0.0 0.0  WBC    nRBC 0.00 0.00 - 0.01 K/uL    Neutrophils % 79 (H) 32 - 75 %    Lymphocytes % 7 (L) 12 - 49 %    Monocytes % 13 5 - 13 %    Eosinophils % 1 0 - 7 %    Basophils % 0 0 - 1 %    Immature Granulocytes 0 0 - 0.5 %    Neutrophils Absolute 6.6 1.8 - 8.0 K/UL    Lymphocytes Absolute 0.6 (L) 0.8 - 3.5 K/UL    Monocytes Absolute 1.1 (H) 0.0 - 1.0 K/UL    Eosinophils Absolute 0.1 0.0 - 0.4 K/UL    Basophils Absolute 0.0 0.0 - 0.1 K/UL    Absolute Immature Granulocyte 0.0 0.00 - 0.04 K/UL    Differential Type AUTOMATED     Comprehensive Metabolic Panel    Collection Time: 05/17/23 12:33 PM   Result Value Ref Range    Sodium 132 (L) 136 - 145 mmol/L    Potassium 3.5 3.5 - 5.1 mmol/L    Chloride 101 97 - 108 mmol/L    CO2 24 21 - 32 mmol/L    Anion Gap 7 5 - 15 mmol/L    Glucose 80 65 - 100 mg/dL    BUN 24 (H) 6 - 20 mg/dL    Creatinine 1.07 0.70 - 1.30 mg/dL    Bun/Cre Ratio 22 (H) 12 - 20      Est, Glom Filt Rate >60 >60 ml/min/1.73m2    Calcium 8.6 8.5 - 10.1 mg/dL    Total Bilirubin 0.8 0.2 - 1.0 mg/dL    AST 42 (H) 15 - 37 U/L    ALT 37 12 - 78 U/L    Alk Phosphatase 57 45 - 117 U/L    Total Protein 6.9 6.4 - 8.2 g/dL    Albumin 3.3 (L) 3.5 - 5.0 g/dL    Globulin 3.6 2.0 - 4.0 g/dL    Albumin/Globulin Ratio 0.9 (L) 1.1 - 2.2     Ethanol    Collection Time: 05/17/23 12:33 PM

## 2023-05-18 ENCOUNTER — APPOINTMENT (OUTPATIENT)
Facility: HOSPITAL | Age: 63
End: 2023-05-18
Payer: COMMERCIAL

## 2023-05-18 LAB
ALBUMIN SERPL-MCNC: 3.3 G/DL (ref 3.5–5)
ALBUMIN/GLOB SERPL: 0.9 (ref 1.1–2.2)
ALP SERPL-CCNC: 63 U/L (ref 45–117)
ALT SERPL-CCNC: 33 U/L (ref 12–78)
ANION GAP SERPL CALC-SCNC: 3 MMOL/L (ref 5–15)
AST SERPL W P-5'-P-CCNC: 28 U/L (ref 15–37)
BASOPHILS # BLD: 0 K/UL (ref 0–0.1)
BASOPHILS NFR BLD: 0 % (ref 0–1)
BILIRUB SERPL-MCNC: 0.8 MG/DL (ref 0.2–1)
BUN SERPL-MCNC: 20 MG/DL (ref 6–20)
BUN/CREAT SERPL: 24 (ref 12–20)
CA-I BLD-MCNC: 8.9 MG/DL (ref 8.5–10.1)
CHLORIDE SERPL-SCNC: 103 MMOL/L (ref 97–108)
CO2 SERPL-SCNC: 25 MMOL/L (ref 21–32)
CREAT SERPL-MCNC: 0.84 MG/DL (ref 0.7–1.3)
DIFFERENTIAL METHOD BLD: ABNORMAL
ECHO AO ASC DIAM: 3.1 CM
ECHO AO ASCENDING AORTA INDEX: 1.56 CM/M2
ECHO AO ROOT DIAM: 3 CM
ECHO AO ROOT INDEX: 1.51 CM/M2
ECHO AV AREA PEAK VELOCITY: 2.2 CM2
ECHO AV AREA VTI: 2.2 CM2
ECHO AV AREA/BSA PEAK VELOCITY: 1.1 CM2/M2
ECHO AV AREA/BSA VTI: 1.1 CM2/M2
ECHO AV MEAN GRADIENT: 6 MMHG
ECHO AV MEAN VELOCITY: 1.2 M/S
ECHO AV PEAK GRADIENT: 11 MMHG
ECHO AV PEAK VELOCITY: 1.7 M/S
ECHO AV VELOCITY RATIO: 0.65
ECHO AV VTI: 37.5 CM
ECHO BSA: 1.98 M2
ECHO LA AREA 2C: 21.5 CM2
ECHO LA AREA 4C: 17.9 CM2
ECHO LA DIAMETER INDEX: 1.71 CM/M2
ECHO LA DIAMETER: 3.4 CM
ECHO LA MAJOR AXIS: 4.9 CM
ECHO LA MINOR AXIS: 5.5 CM
ECHO LA TO AORTIC ROOT RATIO: 1.13
ECHO LA VOL 2C: 68 ML (ref 18–58)
ECHO LA VOL 4C: 50 ML (ref 18–58)
ECHO LA VOL BP: 62 ML (ref 18–58)
ECHO LA VOL/BSA BIPLANE: 31 ML/M2 (ref 16–34)
ECHO LA VOLUME INDEX A2C: 34 ML/M2 (ref 16–34)
ECHO LA VOLUME INDEX A4C: 25 ML/M2 (ref 16–34)
ECHO LV E' LATERAL VELOCITY: 14 CM/S
ECHO LV E' SEPTAL VELOCITY: 9 CM/S
ECHO LV EDV A2C: 129 ML
ECHO LV EDV A4C: 122 ML
ECHO LV EDV INDEX A4C: 61 ML/M2
ECHO LV EDV NDEX A2C: 65 ML/M2
ECHO LV EJECTION FRACTION A2C: 62 %
ECHO LV EJECTION FRACTION A4C: 61 %
ECHO LV EJECTION FRACTION BIPLANE: 62 % (ref 55–100)
ECHO LV ESV A2C: 49 ML
ECHO LV ESV A4C: 47 ML
ECHO LV ESV INDEX A2C: 25 ML/M2
ECHO LV ESV INDEX A4C: 24 ML/M2
ECHO LV FRACTIONAL SHORTENING: 33 % (ref 28–44)
ECHO LV INTERNAL DIMENSION DIASTOLE INDEX: 2.31 CM/M2
ECHO LV INTERNAL DIMENSION DIASTOLIC: 4.6 CM (ref 4.2–5.9)
ECHO LV INTERNAL DIMENSION SYSTOLIC INDEX: 1.56 CM/M2
ECHO LV INTERNAL DIMENSION SYSTOLIC: 3.1 CM
ECHO LV IVSD: 0.8 CM (ref 0.6–1)
ECHO LV MASS 2D: 127.7 G (ref 88–224)
ECHO LV MASS INDEX 2D: 64.1 G/M2 (ref 49–115)
ECHO LV POSTERIOR WALL DIASTOLIC: 0.9 CM (ref 0.6–1)
ECHO LV RELATIVE WALL THICKNESS RATIO: 0.39
ECHO LVOT AREA: 3.5 CM2
ECHO LVOT AV VTI INDEX: 0.63
ECHO LVOT DIAM: 2.1 CM
ECHO LVOT MEAN GRADIENT: 2 MMHG
ECHO LVOT PEAK GRADIENT: 5 MMHG
ECHO LVOT PEAK VELOCITY: 1.1 M/S
ECHO LVOT STROKE VOLUME INDEX: 41.2 ML/M2
ECHO LVOT SV: 82 ML
ECHO LVOT VTI: 23.7 CM
ECHO MV A VELOCITY: 0.83 M/S
ECHO MV E DECELERATION TIME (DT): 266 MS
ECHO MV E VELOCITY: 0.71 M/S
ECHO MV E/A RATIO: 0.86
ECHO MV E/E' LATERAL: 5.07
ECHO MV E/E' RATIO (AVERAGED): 6.48
ECHO MV E/E' SEPTAL: 7.89
ECHO PV MAX VELOCITY: 0.8 M/S
ECHO PV PEAK GRADIENT: 2 MMHG
ECHO RA AREA 4C: 13 CM2
ECHO RA END SYSTOLIC VOLUME APICAL 4 CHAMBER INDEX BSA: 16 ML/M2
ECHO RA VOLUME: 31 ML
ECHO RV BASAL DIMENSION: 4.3 CM
ECHO RV FREE WALL PEAK S': 12 CM/S
ECHO RV TAPSE: 2.2 CM (ref 1.7–?)
ECHO TV REGURGITANT MAX VELOCITY: 2.45 M/S
ECHO TV REGURGITANT PEAK GRADIENT: 24 MMHG
EOSINOPHIL # BLD: 0.1 K/UL (ref 0–0.4)
EOSINOPHIL NFR BLD: 2 % (ref 0–7)
ERYTHROCYTE [DISTWIDTH] IN BLOOD BY AUTOMATED COUNT: 13.3 % (ref 11.5–14.5)
GLOBULIN SER CALC-MCNC: 3.5 G/DL (ref 2–4)
GLUCOSE SERPL-MCNC: 100 MG/DL (ref 65–100)
HCT VFR BLD AUTO: 32 % (ref 36.6–50.3)
HGB BLD-MCNC: 11.4 G/DL (ref 12.1–17)
IMM GRANULOCYTES # BLD AUTO: 0 K/UL (ref 0–0.04)
IMM GRANULOCYTES NFR BLD AUTO: 0 % (ref 0–0.5)
LYMPHOCYTES # BLD: 0.8 K/UL (ref 0.8–3.5)
LYMPHOCYTES NFR BLD: 15 % (ref 12–49)
MCH RBC QN AUTO: 33.9 PG (ref 26–34)
MCHC RBC AUTO-ENTMCNC: 35.6 G/DL (ref 30–36.5)
MCV RBC AUTO: 95.2 FL (ref 80–99)
MONOCYTES # BLD: 1 K/UL (ref 0–1)
MONOCYTES NFR BLD: 20 % (ref 5–13)
NEUTS SEG # BLD: 3.3 K/UL (ref 1.8–8)
NEUTS SEG NFR BLD: 63 % (ref 32–75)
NRBC # BLD: 0 K/UL (ref 0–0.01)
NRBC BLD-RTO: 0 PER 100 WBC
PLATELET # BLD AUTO: 148 K/UL (ref 150–400)
PMV BLD AUTO: 10.4 FL (ref 8.9–12.9)
POTASSIUM SERPL-SCNC: 3.6 MMOL/L (ref 3.5–5.1)
PROT SERPL-MCNC: 6.8 G/DL (ref 6.4–8.2)
RBC # BLD AUTO: 3.36 M/UL (ref 4.1–5.7)
SODIUM SERPL-SCNC: 131 MMOL/L (ref 136–145)
WBC # BLD AUTO: 5.2 K/UL (ref 4.1–11.1)

## 2023-05-18 PROCEDURE — 93306 TTE W/DOPPLER COMPLETE: CPT

## 2023-05-18 PROCEDURE — 36415 COLL VENOUS BLD VENIPUNCTURE: CPT

## 2023-05-18 PROCEDURE — 97165 OT EVAL LOW COMPLEX 30 MIN: CPT

## 2023-05-18 PROCEDURE — 2580000003 HC RX 258: Performed by: FAMILY MEDICINE

## 2023-05-18 PROCEDURE — 72125 CT NECK SPINE W/O DYE: CPT

## 2023-05-18 PROCEDURE — 80053 COMPREHEN METABOLIC PANEL: CPT

## 2023-05-18 PROCEDURE — G0378 HOSPITAL OBSERVATION PER HR: HCPCS

## 2023-05-18 PROCEDURE — 6360000002 HC RX W HCPCS: Performed by: FAMILY MEDICINE

## 2023-05-18 PROCEDURE — 97161 PT EVAL LOW COMPLEX 20 MIN: CPT

## 2023-05-18 PROCEDURE — 97530 THERAPEUTIC ACTIVITIES: CPT

## 2023-05-18 PROCEDURE — 93880 EXTRACRANIAL BILAT STUDY: CPT | Performed by: SURGERY

## 2023-05-18 PROCEDURE — 6370000000 HC RX 637 (ALT 250 FOR IP): Performed by: EMERGENCY MEDICINE

## 2023-05-18 PROCEDURE — 96372 THER/PROPH/DIAG INJ SC/IM: CPT

## 2023-05-18 PROCEDURE — 85025 COMPLETE CBC W/AUTO DIFF WBC: CPT

## 2023-05-18 PROCEDURE — 93880 EXTRACRANIAL BILAT STUDY: CPT

## 2023-05-18 PROCEDURE — 6370000000 HC RX 637 (ALT 250 FOR IP): Performed by: FAMILY MEDICINE

## 2023-05-18 RX ORDER — CHLORDIAZEPOXIDE HYDROCHLORIDE 5 MG/1
10 CAPSULE, GELATIN COATED ORAL EVERY 6 HOURS PRN
Status: DISCONTINUED | OUTPATIENT
Start: 2023-05-18 | End: 2023-05-19 | Stop reason: HOSPADM

## 2023-05-18 RX ADMIN — ASPIRIN 81 MG 81 MG: 81 TABLET ORAL at 08:59

## 2023-05-18 RX ADMIN — CARVEDILOL 12.5 MG: 12.5 TABLET, FILM COATED ORAL at 20:11

## 2023-05-18 RX ADMIN — SODIUM CHLORIDE: 9 INJECTION, SOLUTION INTRAVENOUS at 14:53

## 2023-05-18 RX ADMIN — ASPIRIN 81 MG 81 MG: 81 TABLET ORAL at 01:23

## 2023-05-18 RX ADMIN — LOSARTAN POTASSIUM 100 MG: 50 TABLET, FILM COATED ORAL at 08:59

## 2023-05-18 RX ADMIN — Medication 5 MG: at 01:23

## 2023-05-18 RX ADMIN — SODIUM CHLORIDE: 9 INJECTION, SOLUTION INTRAVENOUS at 01:33

## 2023-05-18 RX ADMIN — ACETAMINOPHEN 650 MG: 325 TABLET ORAL at 01:26

## 2023-05-18 RX ADMIN — AMLODIPINE BESYLATE 10 MG: 5 TABLET ORAL at 08:59

## 2023-05-18 RX ADMIN — ENOXAPARIN SODIUM 40 MG: 100 INJECTION SUBCUTANEOUS at 08:58

## 2023-05-18 RX ADMIN — CARVEDILOL 12.5 MG: 12.5 TABLET, FILM COATED ORAL at 08:59

## 2023-05-18 RX ADMIN — Medication 5 MG: at 20:11

## 2023-05-18 RX ADMIN — ATORVASTATIN CALCIUM 40 MG: 40 TABLET, FILM COATED ORAL at 20:11

## 2023-05-18 RX ADMIN — PANTOPRAZOLE SODIUM 40 MG: 40 TABLET, DELAYED RELEASE ORAL at 08:59

## 2023-05-18 RX ADMIN — ACETAMINOPHEN 650 MG: 325 TABLET ORAL at 20:11

## 2023-05-18 RX ADMIN — ATORVASTATIN CALCIUM 40 MG: 40 TABLET, FILM COATED ORAL at 01:23

## 2023-05-18 ASSESSMENT — PAIN SCALES - GENERAL
PAINLEVEL_OUTOF10: 0
PAINLEVEL_OUTOF10: 0
PAINLEVEL_OUTOF10: 9
PAINLEVEL_OUTOF10: 3

## 2023-05-18 ASSESSMENT — PAIN DESCRIPTION - DESCRIPTORS
DESCRIPTORS: ACHING;TIGHTNESS
DESCRIPTORS: ACHING

## 2023-05-18 ASSESSMENT — PAIN DESCRIPTION - ORIENTATION: ORIENTATION: POSTERIOR

## 2023-05-18 ASSESSMENT — PAIN DESCRIPTION - LOCATION
LOCATION: BACK;GENERALIZED
LOCATION: BACK;GENERALIZED

## 2023-05-18 NOTE — CONSULTS
NEURO CONSULT      REASON FOR ADMISSION:  Syncope      HISTORY:  Mr. Joshua Pearce is 63years old with a history of \"brain aneurysm\" for which she has aneurysmal clipping, DDD, atherosclerotic heart disease, hypertension, gastroesophageal reflux disease, hyperlipidemia, colon polyps, previous appendectomy, who is consulted to neurology for recurrent syncope; patient states that there are no particular triggers for the syncope. Patient had a head CT in the ED that is showing right inferior lobe encephalomalacia.   A CT of the C-spine did not show any acute process      ROS: As per above plus more    General:                     No fever, no chills, no sweats, no generalized weakness, no weight loss/gain,                                       No loss of appetite   Eyes:                           No blurred vision, no eye pain, no loss of vision, no double vision  ENT:                            rhinorrhea, no pharyngitis   Respiratory:               No cough, no sputum production, no SOB, no CRAWFORD, no wheezing, no pleuritic pain   Cardiology:                No chest pain, no palpitations, no orthopnea, no PND, no edema, no syncope   Gastrointestinal:       No abdominal pain , no N/V, no diarrhea, no dysphagia, no constipation, no bleeding   Genitourinary:           frequency, no urgency, no dysuria, no hematuria, no incontinence   Muskuloskeletal :      No arthralgia, no myalgia, no back pain  Hematology:              No easy bruising, no nose or gum bleeding, no lymphadenopathy   Dermatological:         No rash, no ulceration, no pruritis, no color change / jaundice  Endocrine:                 hot flashes or polydipsia   Neurological:             No headache, no dizziness, no confusion, no focal weakness, no paresthesia,                                      No Speech difficulties, no memory loss, no gait difficulty  Psychological:          No neelings of anxiety, no depression, no agitation      NEURO EXAM:    Mental
Provider, MD   carvedilol (COREG) 12.5 MG tablet Take 1 tablet by mouth 2 times daily 4/20/23   Historical Provider, MD       Recent Results (from the past 24 hour(s))   EKG 12 Lead    Collection Time: 05/17/23 12:23 PM   Result Value Ref Range    Ventricular Rate 70 BPM    Atrial Rate 70 BPM    P-R Interval 140 ms    QRS Duration 96 ms    Q-T Interval 396 ms    QTc Calculation (Bazett) 427 ms    P Axis 78 degrees    R Axis 62 degrees    T Axis 72 degrees    Diagnosis       Normal sinus rhythm  Moderate voltage criteria for LVH, may be normal variant  Nonspecific ST and T wave abnormality  Abnormal ECG  When compared with ECG of 08-AUG-2020 19:28,  PREVIOUS ECG IS PRESENT  Confirmed by Farzana Mills ([id number]) on 5/17/2023 3:27:07 PM     CBC with Auto Differential    Collection Time: 05/17/23 12:33 PM   Result Value Ref Range    WBC 8.4 4.1 - 11.1 K/uL    RBC 3.13 (L) 4.10 - 5.70 M/uL    Hemoglobin 10.9 (L) 12.1 - 17.0 g/dL    Hematocrit 29.4 (L) 36.6 - 50.3 %    MCV 93.9 80.0 - 99.0 FL    MCH 34.8 (H) 26.0 - 34.0 PG    MCHC 37.1 (H) 30.0 - 36.5 g/dL    RDW 13.3 11.5 - 14.5 %    Platelets 671 (L) 457 - 400 K/uL    MPV 10.6 8.9 - 12.9 FL    Nucleated RBCs 0.0 0.0  WBC    nRBC 0.00 0.00 - 0.01 K/uL    Neutrophils % 79 (H) 32 - 75 %    Lymphocytes % 7 (L) 12 - 49 %    Monocytes % 13 5 - 13 %    Eosinophils % 1 0 - 7 %    Basophils % 0 0 - 1 %    Immature Granulocytes 0 0 - 0.5 %    Neutrophils Absolute 6.6 1.8 - 8.0 K/UL    Lymphocytes Absolute 0.6 (L) 0.8 - 3.5 K/UL    Monocytes Absolute 1.1 (H) 0.0 - 1.0 K/UL    Eosinophils Absolute 0.1 0.0 - 0.4 K/UL    Basophils Absolute 0.0 0.0 - 0.1 K/UL    Absolute Immature Granulocyte 0.0 0.00 - 0.04 K/UL    Differential Type AUTOMATED     Comprehensive Metabolic Panel    Collection Time: 05/17/23 12:33 PM   Result Value Ref Range    Sodium 132 (L) 136 - 145 mmol/L    Potassium 3.5 3.5 - 5.1 mmol/L    Chloride 101 97 - 108 mmol/L    CO2 24 21 - 32 mmol/L    Anion Gap 7 5 -

## 2023-05-18 NOTE — CARE COORDINATION
CM reviewed chart and spoke with primary physician. Patient is currently being recommended for SNF by PT/OT. Primary physician feels that a lot of patient's inability to fully participate in therapy is due to his tremors cause by withdrawing from ETOH. Physician stated that CM should follow up with PT/OT tomorrow to see what patient's progress is and go from there. Physician does not feel SNF is appropriate for patient at this time. CM will continue to follow.

## 2023-05-19 VITALS
HEIGHT: 73 IN | SYSTOLIC BLOOD PRESSURE: 124 MMHG | WEIGHT: 166.23 LBS | HEART RATE: 88 BPM | DIASTOLIC BLOOD PRESSURE: 89 MMHG | BODY MASS INDEX: 22.03 KG/M2 | TEMPERATURE: 97.8 F | OXYGEN SATURATION: 97 % | RESPIRATION RATE: 18 BRPM

## 2023-05-19 PROBLEM — R55 SYNCOPAL VERTIGO: Status: RESOLVED | Noted: 2023-05-17 | Resolved: 2023-05-19

## 2023-05-19 PROBLEM — R42 SYNCOPAL VERTIGO: Status: RESOLVED | Noted: 2023-05-17 | Resolved: 2023-05-19

## 2023-05-19 PROBLEM — R55 SYNCOPE AND COLLAPSE: Status: RESOLVED | Noted: 2023-05-17 | Resolved: 2023-05-19

## 2023-05-19 LAB
ECHO BSA: 1.98 M2
VAS LEFT ARM BP: 136 MMHG
VAS LEFT CCA DIST EDV: 17.6 CM/S
VAS LEFT CCA DIST PSV: 71 CM/S
VAS LEFT CCA PROX EDV: 21.4 CM/S
VAS LEFT CCA PROX PSV: 93.9 CM/S
VAS LEFT ECA EDV: 15.3 CM/S
VAS LEFT ECA PSV: 101 CM/S
VAS LEFT ICA DIST EDV: 26 CM/S
VAS LEFT ICA DIST PSV: 100 CM/S
VAS LEFT ICA PROX EDV: 22.9 CM/S
VAS LEFT ICA PROX PSV: 77.1 CM/S
VAS LEFT ICA/CCA PSV: 1.09
VAS LEFT SUBCLAVIAN PROX EDV: 10.8 CM/S
VAS LEFT SUBCLAVIAN PROX PSV: 94.7 CM/S
VAS LEFT VERTEBRAL EDV: 7.6 CM/S
VAS LEFT VERTEBRAL PSV: 55.8 CM/S
VAS RIGHT ARM BP: 133 MMHG
VAS RIGHT CCA DIST EDV: 20.6 CM/S
VAS RIGHT CCA DIST PSV: 87.1 CM/S
VAS RIGHT CCA PROX EDV: 13 CM/S
VAS RIGHT CCA PROX PSV: 89.4 CM/S
VAS RIGHT ECA EDV: 0.8 CM/S
VAS RIGHT ECA PSV: 77.1 CM/S
VAS RIGHT ICA DIST EDV: 31.3 CM/S
VAS RIGHT ICA DIST PSV: 95.5 CM/S
VAS RIGHT ICA PROX EDV: 30.6 CM/S
VAS RIGHT ICA PROX PSV: 92.4 CM/S
VAS RIGHT ICA/CCA PSV: 1.06
VAS RIGHT SUBCLAVIAN PROX EDV: 6.4 CM/S
VAS RIGHT SUBCLAVIAN PROX PSV: 69.5 CM/S
VAS RIGHT VERTEBRAL EDV: 17.6 CM/S
VAS RIGHT VERTEBRAL PSV: 68 CM/S

## 2023-05-19 PROCEDURE — 97530 THERAPEUTIC ACTIVITIES: CPT

## 2023-05-19 PROCEDURE — 6370000000 HC RX 637 (ALT 250 FOR IP): Performed by: FAMILY MEDICINE

## 2023-05-19 PROCEDURE — G0378 HOSPITAL OBSERVATION PER HR: HCPCS

## 2023-05-19 PROCEDURE — 96372 THER/PROPH/DIAG INJ SC/IM: CPT

## 2023-05-19 PROCEDURE — 6360000002 HC RX W HCPCS: Performed by: FAMILY MEDICINE

## 2023-05-19 PROCEDURE — 6370000000 HC RX 637 (ALT 250 FOR IP): Performed by: EMERGENCY MEDICINE

## 2023-05-19 PROCEDURE — 93880 EXTRACRANIAL BILAT STUDY: CPT | Performed by: SURGERY

## 2023-05-19 RX ORDER — NICOTINE 21 MG/24HR
1 PATCH, TRANSDERMAL 24 HOURS TRANSDERMAL DAILY
Qty: 30 PATCH | Refills: 3 | Status: SHIPPED | OUTPATIENT
Start: 2023-05-20

## 2023-05-19 RX ORDER — ATORVASTATIN CALCIUM 40 MG/1
40 TABLET, FILM COATED ORAL NIGHTLY
Qty: 30 TABLET | Refills: 3 | Status: SHIPPED | OUTPATIENT
Start: 2023-05-19

## 2023-05-19 RX ORDER — ASPIRIN 81 MG/1
81 TABLET, CHEWABLE ORAL DAILY
Qty: 30 TABLET | Refills: 3 | Status: SHIPPED | OUTPATIENT
Start: 2023-05-20

## 2023-05-19 RX ADMIN — CARVEDILOL 12.5 MG: 12.5 TABLET, FILM COATED ORAL at 08:22

## 2023-05-19 RX ADMIN — ASPIRIN 81 MG 81 MG: 81 TABLET ORAL at 08:21

## 2023-05-19 RX ADMIN — LOSARTAN POTASSIUM 100 MG: 50 TABLET, FILM COATED ORAL at 08:22

## 2023-05-19 RX ADMIN — PANTOPRAZOLE SODIUM 40 MG: 40 TABLET, DELAYED RELEASE ORAL at 08:23

## 2023-05-19 RX ADMIN — ENOXAPARIN SODIUM 40 MG: 100 INJECTION SUBCUTANEOUS at 08:21

## 2023-05-19 RX ADMIN — AMLODIPINE BESYLATE 10 MG: 5 TABLET ORAL at 08:21

## 2023-05-19 NOTE — PROGRESS NOTES
Comprehensive Nutrition Assessment    Type and Reason for Visit:  Initial (MST 2)    Nutrition Recommendations/Plan:   Continue regular low fat/low chol/high fiber/2gm Na diet. Continue to monitor PO intakes at meals. Start MVI, vitamin C, and B-complex. Malnutrition Assessment:  Malnutrition Status:  No malnutrition (05/19/23 1118)    Context:  Acute Illness     Findings of the 6 clinical characteristics of malnutrition:  Energy Intake:  No significant decrease in energy intake  Weight Loss:  Unable to assess     Body Fat Loss:  No significant body fat loss     Muscle Mass Loss:  No significant muscle mass loss (uses cane at home)    Fluid Accumulation:  No significant fluid accumulation     Strength:  Not Performed    Nutrition Assessment:    Pt admitted for syncope and collapse. Assessment for MST of 2. Pt reports UBW around 175#, hx of minimal wt loss due to dehydration. Good PO intakes pta per pt. Pt reports intakes of >75% at breakfast. Continue with current diet. Labs: Na 131, H/H 11.4/32. Meds: Lipitor, Lovenex, Norvasc, Coreg, Protonix. Nutrition Related Findings:    NFPE revealved no wasting. Pt reports no issue with C/S. Pt reports no N/V or D/C. Last BM 5-19. No edema Wound Type: None       Current Nutrition Intake & Therapies:    Average Meal Intake: %  Average Supplements Intake: None Ordered  ADULT DIET; Regular; Low Fat/Low Chol/High Fiber/2 gm Na    Anthropometric Measures:  Height: 185.4 cm (6' 0.99\")  Ideal Body Weight (IBW): 184 lbs (84 kg)    Admission Body Weight: 166 lb 3.6 oz (75.4 kg)  Current Body Weight: 163 lb 12.8 oz (74.3 kg) (5/19/23), 89 % IBW. Weight Source: Bed Scale  Current BMI (kg/m2): 21.6  Usual Body Weight: 175 lb (79.4 kg) (per pt)  % Weight Change (Calculated): -6.4  Weight Adjustment For: No Adjustment  BMI Categories: Normal Weight (BMI 18.5-24. 9)    Estimated Daily Nutrient Needs:  Energy Requirements Based On: Kcal/kg  Weight Used for Energy
General Daily Progress Note      Patient Name:   Sabine Gan. YOB: 1960       Age:  63 y.o. Admit Date: 5/17/2023      Subjective:   CHIEF COMPLAINT:      Syncope     HISTORY OF PRESENT ILLNESS:        Patient is a 63y.o. year old male with a hx of brain aneurysm with clipping, DDD, arteriosclerotic heart disease, HTN, GERD, HLD, colon polyps, previous appendectomy arrives via EMS with a cc of syncope. The patient was at his PCP office getting his BP taken when he lost consciousness for a couple of seconds. The patient states that he has had 5 similar syncopal episodes this week. Another episode occurred after the patient had been sitting and eating dinner for a long period of time. He has hit his head and extremities during the falls. He endorses lightheadedness and palpitations just prior to the syncopal episodes. He denies chest pain, SOB, fever, chills, abdominal pain, headache, changes in vision, weakness, hematochezia, N/V, change in urine. Head CT without contrast: Inferior right frontal lobe encephalomalacia. No acute intracranial hemorrhage, mass or infarct. CXR shows no acute process  3v XR of right wrist shows no fracture     EKG: Normal sinus rhythm   Moderate voltage criteria for LVH, may be normal variant   Nonspecific ST and T wave abnormality   Abnormal ECG   When compared with ECG of 08-AUG-2020 19:28,   PREVIOUS ECG IS PRESENT      Blood EtOH level is 34. States he drinks 6-8 beers on the weekends. He again declines any recent drinking but his wife when interviewed privately does report that patient \"likes to drink\" and probably drinks every day. Received 600mL NS from EMS. 5/18     Cervical spine CT w/o contrast: No fracture or traumatic malalignment     Neurology:  Recurrent syncope etiology unclear. Patient has comorbidities that may contribute to his recurrent syncope.   Rule out cardiogenic etiology  Rule out vertebrobasilar
General Daily Progress Note      Patient Name:   Wili Alicea YOB: 1960       Age:  63 y.o. Admit Date: 5/17/2023      Subjective:   CHIEF COMPLAINT:      Syncope     HISTORY OF PRESENT ILLNESS:        Patient is a 63y.o. year old male with a hx of brain aneurysm with clipping, DDD, arteriosclerotic heart disease, HTN, GERD, HLD, colon polyps, previous appendectomy arrives via EMS with a cc of syncope. The patient was at his PCP office getting his BP taken when he lost consciousness for a couple of seconds. The patient states that he has had 5 similar syncopal episodes this week. Another episode occurred after the patient had been sitting and eating dinner for a long period of time. He has hit his head and extremities during the falls. He endorses lightheadedness and palpitations just prior to the syncopal episodes. He denies chest pain, SOB, fever, chills, abdominal pain, headache, changes in vision, weakness, hematochezia, N/V, change in urine. Head CT without contrast: Inferior right frontal lobe encephalomalacia. No acute intracranial hemorrhage, mass or infarct. CXR shows no acute process  3v XR of right wrist shows no fracture     EKG: Normal sinus rhythm   Moderate voltage criteria for LVH, may be normal variant   Nonspecific ST and T wave abnormality   Abnormal ECG   When compared with ECG of 08-AUG-2020 19:28,   PREVIOUS ECG IS PRESENT      Blood EtOH level is 34. States he drinks 6-8 beers on the weekends. He again declines any recent drinking but his wife when interviewed privately does report that patient \"likes to drink\" and probably drinks every day. Received 600mL NS from EMS. 5/18    Cervical spine CT w/o contrast: No fracture or traumatic malalignment    Neurology:  Recurrent syncope etiology unclear. Patient has comorbidities that may contribute to his recurrent syncope.   Rule out cardiogenic etiology  Rule out vertebrobasilar insufficiency  Rule
NEURO PROGRESS NOTE        SUBJECTIVE:   Syncope  Recurrent falls  History of alcohol abuse      EXAM:  Awake, sitting up in bed,  Cachectic  Sister in room. Patient has not reported any syncopal episode yet  Hard of hearing  Follows commands as best as possible      ASSESSMENT/PLAN:    Yesterday, I found out that patient also has a history of alcohol abuse. I brought this topic up with him and his sister. His sister states that he has a history of alcohol abuse. He finally admitted in somewhat tangential manner that he abuses alcohol.   I  noted that is detrimental to the patient's overall health including the possibility of contributing to his syncopal episodes  In the work-up and treatment continues  We will check neurophysiology    ALLERGIES:    No Known Allergies    MEDS:      Current Facility-Administered Medications:     chlordiazePOXIDE (LIBRIUM) capsule 10 mg, 10 mg, Oral, Q6H PRN, Zeenat Mccray MD    nicotine (NICODERM CQ) 21 MG/24HR 1 patch, 1 patch, TransDERmal, Daily, Madisyn Francis MD, 1 patch at 05/19/23 3980    amLODIPine (NORVASC) tablet 10 mg, 10 mg, Oral, Daily, Matthew Mccray MD, 10 mg at 05/19/23 4883    carvedilol (COREG) tablet 12.5 mg, 12.5 mg, Oral, BID, Matthew Mccray MD, 12.5 mg at 05/19/23 0846    losartan (COZAAR) tablet 100 mg, 100 mg, Oral, Daily, Matthew Mccray MD, 100 mg at 05/19/23 0822    0.9 % sodium chloride infusion, , IntraVENous, Continuous, Matthew Mccray MD, Last Rate: 75 mL/hr at 05/18/23 1453, New Bag at 05/18/23 1453    sodium chloride flush 0.9 % injection 5-40 mL, 5-40 mL, IntraVENous, PRN, Zeenat Mccray MD    0.9 % sodium chloride infusion, , IntraVENous, PRN, Zeenat Mccray MD    enoxaparin (LOVENOX) injection 40 mg, 40 mg, SubCUTAneous, Daily, Matthew Mccray MD, 40 mg at 05/19/23 0821    ondansetron (ZOFRAN-ODT) disintegrating tablet 4 mg, 4 mg, Oral, Q8H PRN **OR** ondansetron (ZOFRAN) injection 4 mg, 4 mg, IntraVENous, Q6H PRN, Zeenat Dial
Palpitations. Gastrointestinal: Negative for abdominal pain and nausea. Skin: Negative. Neurological: Weakness in b/l [employer] Physical Exam:      Constitutional: pt is oriented to person, place, and time. In no acute distress. HENT:   Head: Normocephalic and atraumatic. Eyes: Pupils are equal, round, and reactive to light. EOM are normal.   Cardiovascular: Normal rate, irregular rhythm and normal heart sounds. Pulmonary/Chest: Breath sounds normal. No wheezes. No rales. Exhibits no tenderness. Abdominal: Soft. Bowel sounds are normal. There is no abdominal tenderness. There is no rebound and no guarding. Musculoskeletal: Normal range of motion. 3/5 strength against hip adduction. 4/5 strength with knee extension and hip abduction. Vascular duplex carotid bilateral         CT CERVICAL SPINE WO CONTRAST   Final Result   No fracture or traumatic malalignment. XR CHEST PORTABLE   Final Result      No acute process on portable chest.         XR WRIST RIGHT (MIN 3 VIEWS)   Final Result      No fracture. CT HEAD WO CONTRAST   Final Result   Inferior right frontal lobe encephalomalacia. No acute intracranial   hemorrhage, mass or infarct.                 Recent Results (from the past 24 hour(s))   Troponin    Collection Time: 05/17/23  6:35 PM   Result Value Ref Range    Troponin, High Sensitivity 6 0 - 76 ng/L   Comprehensive Metabolic Panel w/ Reflex to MG    Collection Time: 05/18/23  8:13 AM   Result Value Ref Range    Sodium 131 (L) 136 - 145 mmol/L    Potassium 3.6 3.5 - 5.1 mmol/L    Chloride 103 97 - 108 mmol/L    CO2 25 21 - 32 mmol/L    Anion Gap 3 (L) 5 - 15 mmol/L    Glucose 100 65 - 100 mg/dL    BUN 20 6 - 20 mg/dL    Creatinine 0.84 0.70 - 1.30 mg/dL    Bun/Cre Ratio 24 (H) 12 - 20      Est, Glom Filt Rate >60 >60 ml/min/1.73m2    Calcium 8.9 8.5 - 10.1 mg/dL    Total Bilirubin 0.8 0.2 - 1.0 mg/dL    AST 28 15 - 37 U/L    ALT 33 12 - 78 U/L    Alk Phosphatase 63 45 -

## 2023-05-19 NOTE — DISCHARGE INSTRUCTIONS
Discharge Instructions       PATIENT ID: Leidy Marshall. MRN: [id number]   YOB: 1960    DATE OF ADMISSION: 5/17/2023  DATE OF DISCHARGE: 5/19/2023    PRIMARY CARE PROVIDER: [unfilled]     ATTENDING PHYSICIAN: Miroslava Samuel MD   DISCHARGING PROVIDER: Miroslava Samuel MD    To contact this individual call [phone] and ask the  to page. If unavailable, ask to be transferred the Adult Hospitalist Department. DISCHARGE DIAGNOSES couple episode likely from alcohol    CONSULTATIONS: [unfilled]      PROCEDURES/SURGERIES: * No surgery found *    PENDING TEST RESULTS:   At the time of discharge the following test results are still pending: None    FOLLOW UP APPOINTMENTS:   Nguyen Hurley MD  [address] [place] [address] [phone]    Schedule an appointment as soon as possible for a visit in 1 week(s)         ADDITIONAL CARE RECOMMENDATIONS: Discussed with the patient patient family about alcohol dependency    DIET: cardiac diet      ACTIVITY: activity as tolerated    Wound care: Wound Care Order:  submitted to Case Management. Please view [email or web address]     EQUIPMENT needed: ***      DISCHARGE MEDICATIONS:   See Medication Reconciliation Form    It is important that you take the medication exactly as they are prescribed. Keep your medication in the bottles provided by the pharmacist and keep a list of the medication names, dosages, and times to be taken in your wallet. Do not take other medications without consulting your doctor. NOTIFY YOUR PHYSICIAN FOR ANY OF THE FOLLOWING:   Fever over 101 degrees for 24 hours. Chest pain, shortness of breath, fever, chills, nausea, vomiting, diarrhea, change in mentation, falling, weakness, bleeding. Severe pain or pain not relieved by medications. Or, any other signs or symptoms that you may have questions about.       DISPOSITION:    Home With:   OT  PT  [place]  RN       SNF/Inpatient Rehab/LTAC no

## 2023-05-19 NOTE — DISCHARGE SUMMARY
Discharge Summary       PATIENT ID: Renny Samaniego   MRN: [id number]   YOB: 1960    DATE OF ADMISSION: 5/17/2023   DATE OF DISCHARGE:   PRIMARY CARE PROVIDER: [email or web address]      ATTENDING PHYSICIAN: Dorcus Kehr Mohiuddin  DISCHARGING PROVIDER: Dorcus Kehr Mohiuddin      CONSULTATIONS: IP CONSULT TO HOSPITALIST  IP CONSULT TO CARDIOLOGY  IP CONSULT TO NEUROLOGY    PROCEDURES/SURGERIES: * No surgery found *    ADMITTING DIAGNOSES:    Patient Active Problem List    Diagnosis Date Noted    RLQ abdominal pain 04/25/2023    RUQ abdominal pain 07/06/2022    GERD (gastroesophageal reflux disease) 07/06/2022    ASHD (arteriosclerotic heart disease) 07/06/2022    Bilateral impacted cerumen 08/25/2020    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 08/25/2020    Cerebral aneurysm 07/09/2015    Chest pain, unspecified     Essential hypertension, benign     HTN (hypertension)     Urinary frequency        DISCHARGE DIAGNOSES / PLAN:      Syncopal episode/secondary to alcohol dependency  HTN  HLD  DDD  GERD  Arteriosclerotic heart disease  Previous brain aneurysm with clipping           DISCHARGE MEDICATIONS:     Medication List        START taking these medications      aspirin 81 MG chewable tablet  Take 1 tablet by mouth daily  Start taking on: May 20, 2023     atorvastatin 40 MG tablet  Commonly known as: LIPITOR  Take 1 tablet by mouth nightly     nicotine 21 MG/24HR  Commonly known as: NICODERM CQ  Place 1 patch onto the skin daily  Start taking on: May 20, 2023            CONTINUE taking these medications      amLODIPine 10 MG tablet  Commonly known as: NORVASC     carvedilol 12.5 MG tablet  Commonly known as: COREG     losartan 100 MG tablet  Commonly known as: COZAAR     omeprazole 10 MG delayed release capsule  Commonly known as: PRILOSEC            STOP taking these medications      cyclobenzaprine 10 MG tablet  Commonly known as: FLEXERIL               Where to Get Your Medications        These medications were sent

## 2023-05-19 NOTE — PLAN OF CARE
OCCUPATIONAL THERAPY EVALUATION  Patient: Ania Rosas. (59 y.o. male)  Date: 5/18/2023  Primary Diagnosis: Syncope and collapse [R55]  Syncopal vertigo [R55, R42]       Precautions: Fall Risk                In place during session:EKG/telemetry     ASSESSMENT  Pt is a 63 y.o. male presenting to [facility] after syncopal episode at [facility], admitted 5/17 and currently being treated for syncopal vertigo and collapse. CT of head shows R inferior lob encephalomalacia. CT of c-spine shows no acute findings. Awaiting further testing. Pt received semi-supine in bed upon arrival, AXO x4, and agreeable to OT/PT evaluations. Pt is [place]. Pt reports 5 falls in the last week. Based on current observations, pt presents with decreased  functional mobility, independence in ADLs, high-level IADLs, strength, endurance, balance (see below for objective details and assist levels). Overall, pt tolerates session fair w/out pain, dizziness, or SOB during session. Pt noted to have full body tremors impacting coordination and fine motor skills. Pt req'd cues for safety awareness; unsafe during transfers using RW and req'd cues for safety techniques for dizziness during transfers/mobility such as slow turns or turning whole body vs. Head; pt verbalized understanding. He req'd CGA for ambulation using RW; A for walker placement and cues for hand placement during transfers. Pt will benefit from continued skilled OT services to address current impairments and improve IND and safety with self cares and functional transfers/mobility. Current OT d/c recommendation SNF once medically appropriate. At this time, pt would benefit from further reinforcement of safety techniques. D/t high falls risk and pt living alone; he is at high risk for readmission and would benefit from increased SUP.     Other factors to consider for discharge: family/social support, DME, time since onset, severity of deficits, functional baseline     Patient will benefit from
PHYSICAL THERAPY TREATMENT     Patient: Linda Perez (59 y.o. male)  Date: 5/19/2023  Diagnosis: Syncope and collapse [R55]  Syncopal vertigo [R55, R42] Syncope and collapse      Precautions:  Fall Risk                In place during session: IV  Chart, physical therapy assessment, plan of care and goals were reviewed. ASSESSMENT  Patient continues with skilled PT services and is progressing towards goals. Pt sitting at eob upon PT arrival, agreeable to session. Pt A&O x 4.  . (See below for objective details and assist levels). Patient at baseline functional level. much better and  more focused today    Overall pt tolerated session good today with PT. Will continue to benefit from skilled PT services, and will continue to progress as tolerated. Current Level of Function Impacting Discharge (mobility/balance): at baseline functional level. Other factors to consider for discharge: lives alone and high risk for falls       PLAN :  Patient continues to benefit from skilled intervention to address impaired strength and impaired balance. Continue treatment per established plan of care to address goals. Recommend with staff: Amb to bathroom for toileting with gt belt and AD and Amb in hallway    Recommendation for discharge: (in order for the patient to meet his/her long term goals)  Home with [address] [place] [address]    IF patient discharges home will need the following DME:rolling walker       SUBJECTIVE:   Patient stated I am better. I walked in the hallways with nursing last night    OBJECTIVE DATA SUMMARY:   Critical Behavior:  Orientation  Orientation Level: Oriented X4       Functional Mobility Training:  Bed Mobility:  Bed Mobility Training  Overall Level of Assistance: Independent  Rolling: Independent  Supine to Sit: Independent  Transfers:  Transfer Training  Transfer Training: Yes  Overall Level of Assistance: Independent  Sit to Stand: Independent  Stand to Sit: Independent  Stand Pivot
Demonstrated understanding             Thank you for this referral.  Brien Del Castillo, PT  Minutes: 25
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
## 2023-05-29 RX ORDER — METOPROLOL SUCCINATE 100 MG/1
100 TABLET, EXTENDED RELEASE ORAL DAILY
COMMUNITY
Start: 2022-06-17

## 2023-05-29 RX ORDER — ONDANSETRON 4 MG/1
4 TABLET, ORALLY DISINTEGRATING ORAL EVERY 8 HOURS PRN
COMMUNITY
Start: 2022-10-03

## 2023-05-29 RX ORDER — MAGNESIUM OXIDE 400 MG/1
400 TABLET ORAL DAILY
COMMUNITY
Start: 2022-06-27

## 2023-05-29 RX ORDER — TRAMADOL HYDROCHLORIDE 50 MG/1
50 TABLET ORAL EVERY 6 HOURS PRN
COMMUNITY

## 2023-05-29 RX ORDER — ASPIRIN 81 MG/1
1 TABLET, CHEWABLE ORAL DAILY
COMMUNITY
Start: 2020-12-29

## 2023-05-29 RX ORDER — ALBUTEROL SULFATE 0.63 MG/3ML
SOLUTION RESPIRATORY (INHALATION)
COMMUNITY
Start: 2020-12-29

## 2023-05-29 RX ORDER — ATORVASTATIN CALCIUM 10 MG/1
10 TABLET, FILM COATED ORAL DAILY
COMMUNITY
Start: 2022-05-28

## 2023-05-29 RX ORDER — SUCRALFATE 1 G/1
1 TABLET ORAL 4 TIMES DAILY
COMMUNITY
Start: 2022-10-03

## 2023-05-29 RX ORDER — OMEPRAZOLE 40 MG/1
40 CAPSULE, DELAYED RELEASE ORAL DAILY
COMMUNITY

## 2023-07-13 NOTE — INTERVAL H&P NOTE
Update History & Physical    The Patient's History and Physical of July 13, 2022,  was reviewed with the patient and I examined the patient. There was no change. The surgical site was confirmed by the patient and me. Plan:  The risk, benefits, expected outcome, and alternative to the recommended procedure have been discussed with the patient. Patient understands and wants to proceed with the procedure.     Electronically signed by Triston Islas MD on 7/13/2022 at 10:54 AM No abnormalities

## 2023-07-17 ENCOUNTER — APPOINTMENT (OUTPATIENT)
Facility: HOSPITAL | Age: 63
End: 2023-07-17
Payer: COMMERCIAL

## 2023-07-17 ENCOUNTER — HOSPITAL ENCOUNTER (EMERGENCY)
Facility: HOSPITAL | Age: 63
Discharge: HOME | End: 2023-07-17
Attending: EMERGENCY MEDICINE
Payer: COMMERCIAL

## 2023-07-17 ENCOUNTER — TRANSCRIBE ORDERS (OUTPATIENT)
Facility: HOSPITAL | Age: 63
End: 2023-07-17

## 2023-07-17 VITALS
HEART RATE: 88 BPM | SYSTOLIC BLOOD PRESSURE: 175 MMHG | OXYGEN SATURATION: 98 % | RESPIRATION RATE: 16 BRPM | TEMPERATURE: 98.6 F | DIASTOLIC BLOOD PRESSURE: 99 MMHG

## 2023-07-17 DIAGNOSIS — M51.34 THORACIC DEGENERATIVE DISC DISEASE: ICD-10-CM

## 2023-07-17 DIAGNOSIS — G89.29 CHRONIC THORACIC BACK PAIN, UNSPECIFIED BACK PAIN LATERALITY: ICD-10-CM

## 2023-07-17 DIAGNOSIS — Z23 TETANUS-DIPHTHERIA (TD) VACCINATION: ICD-10-CM

## 2023-07-17 DIAGNOSIS — M54.6 CHRONIC THORACIC BACK PAIN, UNSPECIFIED BACK PAIN LATERALITY: ICD-10-CM

## 2023-07-17 DIAGNOSIS — S61.412A LACERATION OF LEFT PALM WITHOUT COMPLICATION, INITIAL ENCOUNTER: Primary | ICD-10-CM
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
## 2023-07-17 DIAGNOSIS — M47.812 CERVICAL SPONDYLOSIS WITHOUT MYELOPATHY: Primary | ICD-10-CM

## 2023-07-17 PROCEDURE — 73130 X-RAY EXAM OF HAND: CPT

## 2023-07-17 PROCEDURE — 90471 IMMUNIZATION ADMIN: CPT | Performed by: EMERGENCY MEDICINE

## 2023-07-17 PROCEDURE — 6370000000 HC RX 637 (ALT 250 FOR IP): Performed by: EMERGENCY MEDICINE

## 2023-07-17 PROCEDURE — 6360000002 HC RX W HCPCS: Performed by: EMERGENCY MEDICINE

## 2023-07-17 PROCEDURE — 90714 TD VACC NO PRESV 7 YRS+ IM: CPT | Performed by: EMERGENCY MEDICINE

## 2023-07-17 PROCEDURE — 12042 INTMD RPR N-HF/GENIT2.6-7.5: CPT

## 2023-07-17 PROCEDURE — 96372 THER/PROPH/DIAG INJ SC/IM: CPT

## 2023-07-17 PROCEDURE — 99284 EMERGENCY DEPT VISIT MOD MDM: CPT

## 2023-07-17 PROCEDURE — 2500000003 HC RX 250 WO HCPCS: Performed by: EMERGENCY MEDICINE

## 2023-07-17 RX ORDER — BUPIVACAINE HYDROCHLORIDE AND EPINEPHRINE 5; 5 MG/ML; UG/ML
10 INJECTION, SOLUTION EPIDURAL; INTRACAUDAL; PERINEURAL ONCE
Status: COMPLETED | OUTPATIENT
Start: 2023-07-17 | End: 2023-07-17

## 2023-07-17 RX ORDER — HYDROCODONE BITARTRATE AND ACETAMINOPHEN 5; 325 MG/1; MG/1
1 TABLET ORAL
Status: COMPLETED | OUTPATIENT
Start: 2023-07-17 | End: 2023-07-17

## 2023-07-17 RX ORDER — TETANUS AND DIPHTHERIA TOXOIDS ADSORBED 2; 2 [LF]/.5ML; [LF]/.5ML
0.5 INJECTION INTRAMUSCULAR ONCE
Status: COMPLETED | OUTPATIENT
Start: 2023-07-17 | End: 2023-07-17

## 2023-07-17 RX ORDER — CEPHALEXIN 500 MG/1
500 CAPSULE ORAL 4 TIMES DAILY
Qty: 28 CAPSULE | Refills: 0 | Status: SHIPPED | OUTPATIENT
Start: 2023-07-17 | End: 2023-07-24

## 2023-07-17 RX ADMIN — BUPIVACAINE HYDROCHLORIDE AND EPINEPHRINE BITARTRATE 10 ML: 5; .005 INJECTION, SOLUTION EPIDURAL; INTRACAUDAL; PERINEURAL at 16:21

## 2023-07-17 RX ADMIN — HYDROCODONE BITARTRATE AND ACETAMINOPHEN 1 TABLET: 5; 325 TABLET ORAL at 15:59

## 2023-07-17 RX ADMIN — LIDOCAINE HYDROCHLORIDE 1000 MG: 10 INJECTION, SOLUTION EPIDURAL; INFILTRATION; INTRACAUDAL; PERINEURAL at 15:59

## 2023-07-17 RX ADMIN — TETANUS AND DIPHTHERIA TOXOIDS ADSORBED 0.5 ML: 2; 2 INJECTION INTRAMUSCULAR at 15:56

## 2023-07-17 ASSESSMENT — PAIN SCALES - GENERAL
PAINLEVEL_OUTOF10: 6
PAINLEVEL_OUTOF10: 0

## 2023-07-17 ASSESSMENT — PAIN - FUNCTIONAL ASSESSMENT
PAIN_FUNCTIONAL_ASSESSMENT: 0-10
PAIN_FUNCTIONAL_ASSESSMENT: 0-10

## 2023-07-17 NOTE — ED TRIAGE NOTES
Pt reports puncture wound while working on vehicle to left hand, bleeding controlled. PT reports last tetanus maybe 8 years ago.

## 2023-07-17 NOTE — ED NOTES
Very pleasant. No complaints- Workmans Comp for Fidelia Melendrez per pt.  Made registration aware     Marylee Macho, RN  07/17/23 0017

## 2024-03-06 ENCOUNTER — HOSPITAL ENCOUNTER (EMERGENCY)
Facility: HOSPITAL | Age: 64
Discharge: HOME | End: 2024-03-06
Attending: STUDENT IN AN ORGANIZED HEALTH CARE EDUCATION/TRAINING PROGRAM
Payer: COMMERCIAL

## 2024-03-06 VITALS
DIASTOLIC BLOOD PRESSURE: 84 MMHG | BODY MASS INDEX: 22.26 KG/M2 | TEMPERATURE: 98.4 F | HEIGHT: 73 IN | WEIGHT: 168 LBS | RESPIRATION RATE: 18 BRPM | SYSTOLIC BLOOD PRESSURE: 155 MMHG | OXYGEN SATURATION: 99 % | HEART RATE: 80 BPM

## 2024-03-06 DIAGNOSIS — N17.9 AKI (ACUTE KIDNEY INJURY) (HCC): Primary | ICD-10-CM

## 2024-03-06 DIAGNOSIS — R11.2 NAUSEA AND VOMITING, UNSPECIFIED VOMITING TYPE: ICD-10-CM

## 2024-03-06 LAB
ANION GAP SERPL CALC-SCNC: 14 MMOL/L (ref 5–15)
BASOPHILS # BLD: 0 K/UL (ref 0–0.1)
BASOPHILS NFR BLD: 0 % (ref 0–1)
BUN SERPL-MCNC: 23 MG/DL (ref 6–20)
BUN/CREAT SERPL: 11 (ref 12–20)
CA-I BLD-MCNC: 8.8 MG/DL (ref 8.5–10.1)
CHLORIDE SERPL-SCNC: 97 MMOL/L (ref 97–108)
CO2 SERPL-SCNC: 23 MMOL/L (ref 21–32)
CREAT SERPL-MCNC: 2.07 MG/DL (ref 0.7–1.3)
DIFFERENTIAL METHOD BLD: ABNORMAL
EOSINOPHIL # BLD: 0 K/UL (ref 0–0.4)
EOSINOPHIL NFR BLD: 0 % (ref 0–7)
ERYTHROCYTE [DISTWIDTH] IN BLOOD BY AUTOMATED COUNT: 14 % (ref 11.5–14.5)
GLUCOSE SERPL-MCNC: 83 MG/DL (ref 65–100)
HCT VFR BLD AUTO: 38.4 % (ref 36.6–50.3)
HGB BLD-MCNC: 13.9 G/DL (ref 12.1–17)
IMM GRANULOCYTES # BLD AUTO: 0 K/UL (ref 0–0.04)
IMM GRANULOCYTES NFR BLD AUTO: 0 % (ref 0–0.5)
LYMPHOCYTES # BLD: 0.5 K/UL (ref 0.8–3.5)
LYMPHOCYTES NFR BLD: 7 % (ref 12–49)
MAGNESIUM SERPL-MCNC: 1.8 MG/DL (ref 1.6–2.4)
MCH RBC QN AUTO: 34.2 PG (ref 26–34)
MCHC RBC AUTO-ENTMCNC: 36.2 G/DL (ref 30–36.5)
MCV RBC AUTO: 94.6 FL (ref 80–99)
MONOCYTES # BLD: 0.7 K/UL (ref 0–1)
MONOCYTES NFR BLD: 9 % (ref 5–13)
NEUTS SEG # BLD: 6.2 K/UL (ref 1.8–8)
NEUTS SEG NFR BLD: 84 % (ref 32–75)
NRBC # BLD: 0 K/UL (ref 0–0.01)
NRBC BLD-RTO: 0 PER 100 WBC
PLATELET # BLD AUTO: 167 K/UL (ref 150–400)
PMV BLD AUTO: 9.8 FL (ref 8.9–12.9)
POTASSIUM SERPL-SCNC: 3.8 MMOL/L (ref 3.5–5.1)
RBC # BLD AUTO: 4.06 M/UL (ref 4.1–5.7)
SODIUM SERPL-SCNC: 134 MMOL/L (ref 136–145)
WBC # BLD AUTO: 7.4 K/UL (ref 4.1–11.1)

## 2024-03-06 PROCEDURE — 36415 COLL VENOUS BLD VENIPUNCTURE: CPT

## 2024-03-06 PROCEDURE — 80048 BASIC METABOLIC PNL TOTAL CA: CPT

## 2024-03-06 PROCEDURE — 83735 ASSAY OF MAGNESIUM: CPT

## 2024-03-06 PROCEDURE — 96361 HYDRATE IV INFUSION ADD-ON: CPT

## 2024-03-06 PROCEDURE — 6360000002 HC RX W HCPCS: Performed by: STUDENT IN AN ORGANIZED HEALTH CARE EDUCATION/TRAINING PROGRAM

## 2024-03-06 PROCEDURE — 2580000003 HC RX 258: Performed by: STUDENT IN AN ORGANIZED HEALTH CARE EDUCATION/TRAINING PROGRAM

## 2024-03-06 PROCEDURE — 99284 EMERGENCY DEPT VISIT MOD MDM: CPT

## 2024-03-06 PROCEDURE — 85025 COMPLETE CBC W/AUTO DIFF WBC: CPT

## 2024-03-06 PROCEDURE — 96374 THER/PROPH/DIAG INJ IV PUSH: CPT

## 2024-03-06 RX ORDER — ONDANSETRON 2 MG/ML
4 INJECTION INTRAMUSCULAR; INTRAVENOUS ONCE
Status: COMPLETED | OUTPATIENT
Start: 2024-03-06 | End: 2024-03-06

## 2024-03-06 RX ORDER — SODIUM CHLORIDE, SODIUM LACTATE, POTASSIUM CHLORIDE, AND CALCIUM CHLORIDE .6; .31; .03; .02 G/100ML; G/100ML; G/100ML; G/100ML
1000 INJECTION, SOLUTION INTRAVENOUS ONCE
Status: COMPLETED | OUTPATIENT
Start: 2024-03-06 | End: 2024-03-06

## 2024-03-06 RX ADMIN — SODIUM CHLORIDE, POTASSIUM CHLORIDE, SODIUM LACTATE AND CALCIUM CHLORIDE 1000 ML: 600; 310; 30; 20 INJECTION, SOLUTION INTRAVENOUS at 15:02

## 2024-03-06 RX ADMIN — ONDANSETRON 4 MG: 2 INJECTION INTRAMUSCULAR; INTRAVENOUS at 15:00

## 2024-03-06 ASSESSMENT — PAIN - FUNCTIONAL ASSESSMENT: PAIN_FUNCTIONAL_ASSESSMENT: NONE - DENIES PAIN

## 2024-03-06 ASSESSMENT — LIFESTYLE VARIABLES
HOW OFTEN DO YOU HAVE A DRINK CONTAINING ALCOHOL: NEVER
HOW MANY STANDARD DRINKS CONTAINING ALCOHOL DO YOU HAVE ON A TYPICAL DAY: PATIENT DOES NOT DRINK

## 2024-03-06 NOTE — ED PROVIDER NOTES
[facility] EMERGENCY DEPT  EMERGENCY DEPARTMENT HISTORY AND PHYSICAL EXAM      Date: 3/6/2024  Patient Name: Petros Wilkins Jr.  MRN: [id number]  Birthdate 1960  Date of evaluation: 3/6/2024  Provider: Enid Oliva MD   Note Started: 2:28 PM EST 3/6/24    HISTORY OF PRESENT ILLNESS     Chief Complaint   Patient presents with    Nausea & Vomiting       History Provided By: Patient    HPI: Petros Wilkins Jr. is a 63 y.o. male with PMH of HTN, GERD, CAD, arthritis comes to the ED with chief complaint nausea and vomiting.  Patient reports recent history of alcohol use.  Since then started developing nausea and vomiting.  He is denying any abdominal pain, chest pain, shortness of breath, changes in his urinary or bowel habits.  He is reporting that he cannot keep food down due to intractable nausea and vomiting at home.  He reported multiple falls in the past.  Last fall was 2 weeks ago.  No head trauma.  He is denying any back pain, extremity pain, or any other complaints or symptoms today.    PAST MEDICAL HISTORY   Past Medical History:  Past Medical History:   Diagnosis Date    Aneurysm (HCC)     Aneurysm (HCC) 5/2015    CURRENT    Arthritis     ASHD (arteriosclerotic heart disease) 7/6/2022    Chest pain, unspecified     NOT CLEAR ON ETIOLOGY, POSSIBLE CHEST WALL, LESS LIKELY ANGINA, CHECK ECHO TO R/O PERIDARDITIS    Colon polyps     COVID-19 01/2022    Essential hypertension, benign     STABLE    GERD (gastroesophageal reflux disease)     HTN (hypertension)     RUQ abdominal pain 7/6/2022    Urinary frequency        Past Surgical History:  Past Surgical History:   Procedure Laterality Date    APPENDECTOMY,W OTHR C      BRAIN ANEURYSM SURGERY      COLONOSCOPY N/A 7/13/2022    COLONOSCOPY (TIVA) performed by Taurus Oakley MD at [facility] ENDOSCOPY    COLONOSCOPY      over 15 years ago-HX OF COLON POLYPS    HERNIA REPAIR      ORTHOPEDIC SURGERY  2005    LEFT ELBOW    OTHER SURGICAL HISTORY      STENTS    OTHER SURGICAL  mLs IntraVENous Stopped 3/6/24 [id number])     ED FINAL IMPRESSION     1. DEBRA (acute kidney injury) (HCC)    2. Nausea and vomiting, unspecified vomiting type          DISPOSITION/PLAN   DISPOSITION Decision To Discharge 03/06/2024 05:23:23 PM    Informed Refusal: 6:17 PM EST     Informed Refusal     The patient is capable of making an informed decision to refuse my all or part of my recommendations.    The patient is alert and oriented with retained perception and understanding of events, context, and relative risk.    The patient is refusing the following elements of my recommended plan of action:  Hospitalization for acute kidney failure    The patient understands that by refusing all or part of my recommendations, [he/she] accepts and tolerates greater risk. The risk is greater but not so high as to evidence loss of capacity or other grounds to forfeit civil rights and autonomy.    The patient also understands the risks, benefits, and alternatives to my recommendations, including:  Risks he is taking are but not limited to worsening kidney failure potentially requiring dialysis and death  Patient report that he would want to hydrate at home and follow-up with primary care doctor    This discussion was witnessed by Romy Alvarado RN    Changing the patient's course and risk would require taking away the patient's civil rights and autonomy with chemical or physical restraints. Given the facts at this time, I do not have sufficient ethical or legal justification to take away the patient's civil rights and autonomy.     PATIENT REFERRED TO:  [facility] EMERGENCY DEPT  [address] [place] [id number] [phone]  Go to   As needed, If symptoms worsen    Celio Meraz MD   [facility] [address] [phone]    Schedule an appointment as soon as possible for a visit   For follow-up, For reevaluation        DISCHARGE MEDICATIONS:     Medication List        ASK your doctor about these

## 2024-03-06 NOTE — ED TRIAGE NOTES
Pt arrives to ED by EMS from home. Pt reports N/V. EMS reports heavy ETOH use and Pt hasn't drank in 2 days. Multiple falls over last month. EMS gave 4mg zofran IV and 500mL NS.

## 2024-03-06 NOTE — ED NOTES
Patient refusing to be admitted to hospital after explanation from provider.  Discharge orders placed and patient discharged.

## (undated) DEVICE — 1200CC GUARDIAN II: Brand: GUARDIAN

## (undated) DEVICE — SOLUTION IRRIG 1000ML STRL H2O USP PLAS POUR BTL

## (undated) DEVICE — JELLY,LUBE,STERILE,FLIP TOP,TUBE,4-OZ: Brand: MEDLINE

## (undated) DEVICE — PAD,PREPPING,CUFFED,24X48,7",NONSTERILE: Brand: MEDLINE

## (undated) DEVICE — TUBING, SUCTION, 9/32" X 10', STRAIGHT: Brand: MEDLINE

## (undated) DEVICE — SPONGE GZ W4XL4IN COT 12 PLY TYP VII WVN C FLD DSGN

## (undated) DEVICE — WASH CLOTH INCONT LO LINT PREM 12X13 IN LF DISP

## (undated) DEVICE — GLOVE ORANGE PI 7 1/2   MSG9075